# Patient Record
Sex: FEMALE | Race: OTHER | NOT HISPANIC OR LATINO
[De-identification: names, ages, dates, MRNs, and addresses within clinical notes are randomized per-mention and may not be internally consistent; named-entity substitution may affect disease eponyms.]

---

## 2019-09-05 ENCOUNTER — TRANSCRIPTION ENCOUNTER (OUTPATIENT)
Age: 54
End: 2019-09-05

## 2020-10-21 ENCOUNTER — INPATIENT (INPATIENT)
Facility: HOSPITAL | Age: 55
LOS: 4 days | Discharge: HOME | DRG: 280 | End: 2020-10-26
Attending: INTERNAL MEDICINE | Admitting: INTERNAL MEDICINE
Payer: MEDICARE

## 2020-10-21 VITALS
RESPIRATION RATE: 22 BRPM | DIASTOLIC BLOOD PRESSURE: 90 MMHG | SYSTOLIC BLOOD PRESSURE: 158 MMHG | HEART RATE: 123 BPM | OXYGEN SATURATION: 100 % | WEIGHT: 163.14 LBS

## 2020-10-21 DIAGNOSIS — E11.9 TYPE 2 DIABETES MELLITUS WITHOUT COMPLICATIONS: ICD-10-CM

## 2020-10-21 DIAGNOSIS — D72.829 ELEVATED WHITE BLOOD CELL COUNT, UNSPECIFIED: ICD-10-CM

## 2020-10-21 DIAGNOSIS — E78.5 HYPERLIPIDEMIA, UNSPECIFIED: ICD-10-CM

## 2020-10-21 DIAGNOSIS — R09.89 OTHER SPECIFIED SYMPTOMS AND SIGNS INVOLVING THE CIRCULATORY AND RESPIRATORY SYSTEMS: ICD-10-CM

## 2020-10-21 DIAGNOSIS — D63.8 ANEMIA IN OTHER CHRONIC DISEASES CLASSIFIED ELSEWHERE: ICD-10-CM

## 2020-10-21 DIAGNOSIS — R79.89 OTHER SPECIFIED ABNORMAL FINDINGS OF BLOOD CHEMISTRY: ICD-10-CM

## 2020-10-21 DIAGNOSIS — N18.5 CHRONIC KIDNEY DISEASE, STAGE 5: ICD-10-CM

## 2020-10-21 DIAGNOSIS — I16.0 HYPERTENSIVE URGENCY: ICD-10-CM

## 2020-10-21 DIAGNOSIS — I50.33 ACUTE ON CHRONIC DIASTOLIC (CONGESTIVE) HEART FAILURE: ICD-10-CM

## 2020-10-21 DIAGNOSIS — E87.5 HYPERKALEMIA: ICD-10-CM

## 2020-10-21 LAB
ALBUMIN SERPL ELPH-MCNC: 3.8 G/DL — SIGNIFICANT CHANGE UP (ref 3.3–5)
ALP SERPL-CCNC: 148 U/L — HIGH (ref 40–120)
ALT FLD-CCNC: 24 U/L — SIGNIFICANT CHANGE UP (ref 10–45)
ANION GAP SERPL CALC-SCNC: 11 MMOL/L — SIGNIFICANT CHANGE UP (ref 5–17)
ANION GAP SERPL CALC-SCNC: 15 MMOL/L — SIGNIFICANT CHANGE UP (ref 5–17)
APPEARANCE UR: CLEAR — SIGNIFICANT CHANGE UP
APPEARANCE UR: CLEAR — SIGNIFICANT CHANGE UP
APTT BLD: 28.1 SEC — SIGNIFICANT CHANGE UP (ref 27.5–35.5)
AST SERPL-CCNC: 27 U/L — SIGNIFICANT CHANGE UP (ref 10–40)
BACTERIA # UR AUTO: PRESENT /HPF
BACTERIA # UR AUTO: PRESENT /HPF
BASOPHILS # BLD AUTO: 0.1 K/UL — SIGNIFICANT CHANGE UP (ref 0–0.2)
BASOPHILS NFR BLD AUTO: 0.5 % — SIGNIFICANT CHANGE UP (ref 0–2)
BILIRUB SERPL-MCNC: 0.2 MG/DL — SIGNIFICANT CHANGE UP (ref 0.2–1.2)
BILIRUB UR-MCNC: NEGATIVE — SIGNIFICANT CHANGE UP
BILIRUB UR-MCNC: NEGATIVE — SIGNIFICANT CHANGE UP
BLD GP AB SCN SERPL QL: NEGATIVE — SIGNIFICANT CHANGE UP
BUN SERPL-MCNC: 74 MG/DL — HIGH (ref 7–23)
BUN SERPL-MCNC: 79 MG/DL — HIGH (ref 7–23)
CALCIUM SERPL-MCNC: 9 MG/DL — SIGNIFICANT CHANGE UP (ref 8.4–10.5)
CALCIUM SERPL-MCNC: 9.3 MG/DL — SIGNIFICANT CHANGE UP (ref 8.4–10.5)
CHLORIDE SERPL-SCNC: 108 MMOL/L — SIGNIFICANT CHANGE UP (ref 96–108)
CHLORIDE SERPL-SCNC: 110 MMOL/L — HIGH (ref 96–108)
CK MB CFR SERPL CALC: 12.5 NG/ML — HIGH (ref 0–6.7)
CK MB CFR SERPL CALC: 13 NG/ML — HIGH (ref 0–6.7)
CK SERPL-CCNC: 239 U/L — HIGH (ref 25–170)
CK SERPL-CCNC: 249 U/L — HIGH (ref 25–170)
CO2 SERPL-SCNC: 14 MMOL/L — LOW (ref 22–31)
CO2 SERPL-SCNC: 16 MMOL/L — LOW (ref 22–31)
COLOR SPEC: YELLOW — SIGNIFICANT CHANGE UP
COLOR SPEC: YELLOW — SIGNIFICANT CHANGE UP
CREAT ?TM UR-MCNC: 30 MG/DL — SIGNIFICANT CHANGE UP
CREAT SERPL-MCNC: 4.94 MG/DL — HIGH (ref 0.5–1.3)
CREAT SERPL-MCNC: 5.06 MG/DL — HIGH (ref 0.5–1.3)
DIFF PNL FLD: ABNORMAL
DIFF PNL FLD: ABNORMAL
EOSINOPHIL # BLD AUTO: 0.29 K/UL — SIGNIFICANT CHANGE UP (ref 0–0.5)
EOSINOPHIL NFR BLD AUTO: 1.6 % — SIGNIFICANT CHANGE UP (ref 0–6)
EPI CELLS # UR: ABNORMAL /HPF (ref 0–5)
EPI CELLS # UR: ABNORMAL /HPF (ref 0–5)
FERRITIN SERPL-MCNC: 535 NG/ML — HIGH (ref 15–150)
GLUCOSE BLDC GLUCOMTR-MCNC: 121 MG/DL — HIGH (ref 70–99)
GLUCOSE BLDC GLUCOMTR-MCNC: 125 MG/DL — HIGH (ref 70–99)
GLUCOSE SERPL-MCNC: 102 MG/DL — HIGH (ref 70–99)
GLUCOSE SERPL-MCNC: 274 MG/DL — HIGH (ref 70–99)
GLUCOSE UR QL: 100
GLUCOSE UR QL: NEGATIVE — SIGNIFICANT CHANGE UP
HCT VFR BLD CALC: 28.8 % — LOW (ref 34.5–45)
HGB BLD-MCNC: 9.2 G/DL — LOW (ref 11.5–15.5)
IMM GRANULOCYTES NFR BLD AUTO: 1.5 % — SIGNIFICANT CHANGE UP (ref 0–1.5)
INR BLD: 0.92 — SIGNIFICANT CHANGE UP (ref 0.88–1.16)
IRON SATN MFR SERPL: 19 % — SIGNIFICANT CHANGE UP (ref 14–50)
IRON SATN MFR SERPL: 44 UG/DL — SIGNIFICANT CHANGE UP (ref 30–160)
KETONES UR-MCNC: NEGATIVE — SIGNIFICANT CHANGE UP
KETONES UR-MCNC: NEGATIVE — SIGNIFICANT CHANGE UP
LEUKOCYTE ESTERASE UR-ACNC: ABNORMAL
LEUKOCYTE ESTERASE UR-ACNC: NEGATIVE — SIGNIFICANT CHANGE UP
LYMPHOCYTES # BLD AUTO: 16.3 % — SIGNIFICANT CHANGE UP (ref 13–44)
LYMPHOCYTES # BLD AUTO: 3 K/UL — SIGNIFICANT CHANGE UP (ref 1–3.3)
MCHC RBC-ENTMCNC: 28.9 PG — SIGNIFICANT CHANGE UP (ref 27–34)
MCHC RBC-ENTMCNC: 31.9 GM/DL — LOW (ref 32–36)
MCV RBC AUTO: 90.6 FL — SIGNIFICANT CHANGE UP (ref 80–100)
MONOCYTES # BLD AUTO: 1.03 K/UL — HIGH (ref 0–0.9)
MONOCYTES NFR BLD AUTO: 5.6 % — SIGNIFICANT CHANGE UP (ref 2–14)
NEUTROPHILS # BLD AUTO: 13.77 K/UL — HIGH (ref 1.8–7.4)
NEUTROPHILS NFR BLD AUTO: 74.5 % — SIGNIFICANT CHANGE UP (ref 43–77)
NITRITE UR-MCNC: NEGATIVE — SIGNIFICANT CHANGE UP
NITRITE UR-MCNC: NEGATIVE — SIGNIFICANT CHANGE UP
NRBC # BLD: 0 /100 WBCS — SIGNIFICANT CHANGE UP (ref 0–0)
NT-PROBNP SERPL-SCNC: HIGH PG/ML (ref 0–300)
OSMOLALITY UR: 310 MOSM/KG — SIGNIFICANT CHANGE UP (ref 300–900)
PH UR: 6 — SIGNIFICANT CHANGE UP (ref 5–8)
PH UR: 6 — SIGNIFICANT CHANGE UP (ref 5–8)
PLATELET # BLD AUTO: 348 K/UL — SIGNIFICANT CHANGE UP (ref 150–400)
POTASSIUM SERPL-MCNC: 5.8 MMOL/L — HIGH (ref 3.5–5.3)
POTASSIUM SERPL-MCNC: 5.9 MMOL/L — HIGH (ref 3.5–5.3)
POTASSIUM SERPL-SCNC: 5.8 MMOL/L — HIGH (ref 3.5–5.3)
POTASSIUM SERPL-SCNC: 5.9 MMOL/L — HIGH (ref 3.5–5.3)
PROCALCITONIN SERPL-MCNC: 0.61 NG/ML — HIGH (ref 0.02–0.1)
PROT ?TM UR-MCNC: >200 MG/DL — HIGH (ref 0–12)
PROT ?TM UR-MCNC: >200 MG/DL — HIGH (ref 0–12)
PROT SERPL-MCNC: 6.8 G/DL — SIGNIFICANT CHANGE UP (ref 6–8.3)
PROT UR-MCNC: 100 MG/DL
PROT UR-MCNC: >=300 MG/DL
PROT/CREAT UR-RTO: >6.7 RATIO — HIGH (ref 0–0.2)
PROTHROM AB SERPL-ACNC: 11.1 SEC — SIGNIFICANT CHANGE UP (ref 10.6–13.6)
RBC # BLD: 3.18 M/UL — LOW (ref 3.8–5.2)
RBC # FLD: 15.9 % — HIGH (ref 10.3–14.5)
RBC CASTS # UR COMP ASSIST: < 5 /HPF — SIGNIFICANT CHANGE UP
RBC CASTS # UR COMP ASSIST: < 5 /HPF — SIGNIFICANT CHANGE UP
RH IG SCN BLD-IMP: POSITIVE — SIGNIFICANT CHANGE UP
SARS-COV-2 RNA SPEC QL NAA+PROBE: SIGNIFICANT CHANGE UP
SODIUM SERPL-SCNC: 137 MMOL/L — SIGNIFICANT CHANGE UP (ref 135–145)
SODIUM SERPL-SCNC: 137 MMOL/L — SIGNIFICANT CHANGE UP (ref 135–145)
SODIUM UR-SCNC: 92 MMOL/L — SIGNIFICANT CHANGE UP
SP GR SPEC: 1.02 — SIGNIFICANT CHANGE UP (ref 1–1.03)
SP GR SPEC: 1.02 — SIGNIFICANT CHANGE UP (ref 1–1.03)
TIBC SERPL-MCNC: 234 UG/DL — SIGNIFICANT CHANGE UP (ref 220–430)
TRANSFERRIN SERPL-MCNC: 194 MG/DL — LOW (ref 200–360)
TROPONIN T SERPL-MCNC: 0.1 NG/ML — CRITICAL HIGH (ref 0–0.01)
TROPONIN T SERPL-MCNC: 0.25 NG/ML — CRITICAL HIGH (ref 0–0.01)
TROPONIN T SERPL-MCNC: 0.25 NG/ML — CRITICAL HIGH (ref 0–0.01)
TSH SERPL-MCNC: 1.29 UIU/ML — SIGNIFICANT CHANGE UP (ref 0.35–4.94)
UIBC SERPL-MCNC: 190 UG/DL — SIGNIFICANT CHANGE UP (ref 110–370)
UROBILINOGEN FLD QL: 0.2 E.U./DL — SIGNIFICANT CHANGE UP
UROBILINOGEN FLD QL: 0.2 E.U./DL — SIGNIFICANT CHANGE UP
WBC # BLD: 18.46 K/UL — HIGH (ref 3.8–10.5)
WBC # FLD AUTO: 18.46 K/UL — HIGH (ref 3.8–10.5)
WBC UR QL: < 5 /HPF — SIGNIFICANT CHANGE UP
WBC UR QL: < 5 /HPF — SIGNIFICANT CHANGE UP

## 2020-10-21 PROCEDURE — 99223 1ST HOSP IP/OBS HIGH 75: CPT

## 2020-10-21 PROCEDURE — 99291 CRITICAL CARE FIRST HOUR: CPT | Mod: CS

## 2020-10-21 PROCEDURE — 93306 TTE W/DOPPLER COMPLETE: CPT | Mod: 26

## 2020-10-21 PROCEDURE — 71045 X-RAY EXAM CHEST 1 VIEW: CPT | Mod: 26

## 2020-10-21 RX ORDER — DEXTROSE 50 % IN WATER 50 %
25 SYRINGE (ML) INTRAVENOUS ONCE
Refills: 0 | Status: DISCONTINUED | OUTPATIENT
Start: 2020-10-21 | End: 2020-10-26

## 2020-10-21 RX ORDER — BUMETANIDE 0.25 MG/ML
2 INJECTION INTRAMUSCULAR; INTRAVENOUS
Refills: 0 | Status: DISCONTINUED | OUTPATIENT
Start: 2020-10-22 | End: 2020-10-22

## 2020-10-21 RX ORDER — BUMETANIDE 0.25 MG/ML
2 INJECTION INTRAMUSCULAR; INTRAVENOUS ONCE
Refills: 0 | Status: COMPLETED | OUTPATIENT
Start: 2020-10-21 | End: 2020-10-21

## 2020-10-21 RX ORDER — HEPARIN SODIUM 5000 [USP'U]/ML
5000 INJECTION INTRAVENOUS; SUBCUTANEOUS EVERY 8 HOURS
Refills: 0 | Status: DISCONTINUED | OUTPATIENT
Start: 2020-10-21 | End: 2020-10-21

## 2020-10-21 RX ORDER — HEPARIN SODIUM 5000 [USP'U]/ML
4400 INJECTION INTRAVENOUS; SUBCUTANEOUS EVERY 6 HOURS
Refills: 0 | Status: DISCONTINUED | OUTPATIENT
Start: 2020-10-21 | End: 2020-10-24

## 2020-10-21 RX ORDER — BUDESONIDE AND FORMOTEROL FUMARATE DIHYDRATE 160; 4.5 UG/1; UG/1
2 AEROSOL RESPIRATORY (INHALATION)
Qty: 0 | Refills: 0 | DISCHARGE

## 2020-10-21 RX ORDER — CARVEDILOL PHOSPHATE 80 MG/1
6.25 CAPSULE, EXTENDED RELEASE ORAL EVERY 12 HOURS
Refills: 0 | Status: DISCONTINUED | OUTPATIENT
Start: 2020-10-21 | End: 2020-10-22

## 2020-10-21 RX ORDER — INSULIN GLARGINE 100 [IU]/ML
16 INJECTION, SOLUTION SUBCUTANEOUS
Qty: 0 | Refills: 0 | DISCHARGE

## 2020-10-21 RX ORDER — ASPIRIN/CALCIUM CARB/MAGNESIUM 324 MG
162 TABLET ORAL ONCE
Refills: 0 | Status: COMPLETED | OUTPATIENT
Start: 2020-10-21 | End: 2020-10-21

## 2020-10-21 RX ORDER — BUDESONIDE AND FORMOTEROL FUMARATE DIHYDRATE 160; 4.5 UG/1; UG/1
0 AEROSOL RESPIRATORY (INHALATION)
Qty: 0 | Refills: 0 | DISCHARGE

## 2020-10-21 RX ORDER — HEPARIN SODIUM 5000 [USP'U]/ML
INJECTION INTRAVENOUS; SUBCUTANEOUS
Qty: 25000 | Refills: 0 | Status: DISCONTINUED | OUTPATIENT
Start: 2020-10-21 | End: 2020-10-22

## 2020-10-21 RX ORDER — INSULIN LISPRO 100/ML
12 VIAL (ML) SUBCUTANEOUS
Qty: 0 | Refills: 0 | DISCHARGE

## 2020-10-21 RX ORDER — LANOLIN ALCOHOL/MO/W.PET/CERES
1 CREAM (GRAM) TOPICAL
Qty: 0 | Refills: 0 | DISCHARGE

## 2020-10-21 RX ORDER — SODIUM ZIRCONIUM CYCLOSILICATE 10 G/10G
10 POWDER, FOR SUSPENSION ORAL ONCE
Refills: 0 | Status: COMPLETED | OUTPATIENT
Start: 2020-10-21 | End: 2020-10-21

## 2020-10-21 RX ORDER — TICAGRELOR 90 MG/1
90 TABLET ORAL
Refills: 0 | Status: DISCONTINUED | OUTPATIENT
Start: 2020-10-22 | End: 2020-10-22

## 2020-10-21 RX ORDER — INSULIN LISPRO 100/ML
4 VIAL (ML) SUBCUTANEOUS
Refills: 0 | Status: DISCONTINUED | OUTPATIENT
Start: 2020-10-21 | End: 2020-10-26

## 2020-10-21 RX ORDER — CALCIUM ACETATE 667 MG
667 TABLET ORAL
Refills: 0 | Status: DISCONTINUED | OUTPATIENT
Start: 2020-10-21 | End: 2020-10-26

## 2020-10-21 RX ORDER — NIFEDIPINE 30 MG
30 TABLET, EXTENDED RELEASE 24 HR ORAL
Refills: 0 | Status: DISCONTINUED | OUTPATIENT
Start: 2020-10-21 | End: 2020-10-26

## 2020-10-21 RX ORDER — ATORVASTATIN CALCIUM 80 MG/1
80 TABLET, FILM COATED ORAL AT BEDTIME
Refills: 0 | Status: DISCONTINUED | OUTPATIENT
Start: 2020-10-21 | End: 2020-10-26

## 2020-10-21 RX ORDER — NITROGLYCERIN 6.5 MG
0.4 CAPSULE, EXTENDED RELEASE ORAL ONCE
Refills: 0 | Status: COMPLETED | OUTPATIENT
Start: 2020-10-21 | End: 2020-10-21

## 2020-10-21 RX ORDER — LANOLIN ALCOHOL/MO/W.PET/CERES
5 CREAM (GRAM) TOPICAL AT BEDTIME
Refills: 0 | Status: DISCONTINUED | OUTPATIENT
Start: 2020-10-21 | End: 2020-10-26

## 2020-10-21 RX ORDER — DEXTROSE 50 % IN WATER 50 %
15 SYRINGE (ML) INTRAVENOUS ONCE
Refills: 0 | Status: DISCONTINUED | OUTPATIENT
Start: 2020-10-21 | End: 2020-10-26

## 2020-10-21 RX ORDER — INFLUENZA VIRUS VACCINE 15; 15; 15; 15 UG/.5ML; UG/.5ML; UG/.5ML; UG/.5ML
0.5 SUSPENSION INTRAMUSCULAR ONCE
Refills: 0 | Status: COMPLETED | OUTPATIENT
Start: 2020-10-21 | End: 2020-10-21

## 2020-10-21 RX ORDER — INSULIN LISPRO 100/ML
VIAL (ML) SUBCUTANEOUS
Refills: 0 | Status: DISCONTINUED | OUTPATIENT
Start: 2020-10-21 | End: 2020-10-26

## 2020-10-21 RX ORDER — LEVALBUTEROL 1.25 MG/.5ML
0.63 SOLUTION, CONCENTRATE RESPIRATORY (INHALATION) EVERY 6 HOURS
Refills: 0 | Status: DISCONTINUED | OUTPATIENT
Start: 2020-10-21 | End: 2020-10-26

## 2020-10-21 RX ORDER — SODIUM BICARBONATE 1 MEQ/ML
2 SYRINGE (ML) INTRAVENOUS
Qty: 0 | Refills: 0 | DISCHARGE

## 2020-10-21 RX ORDER — TICAGRELOR 90 MG/1
180 TABLET ORAL ONCE
Refills: 0 | Status: COMPLETED | OUTPATIENT
Start: 2020-10-21 | End: 2020-10-21

## 2020-10-21 RX ORDER — SODIUM BICARBONATE 1 MEQ/ML
650 SYRINGE (ML) INTRAVENOUS THREE TIMES A DAY
Refills: 0 | Status: DISCONTINUED | OUTPATIENT
Start: 2020-10-21 | End: 2020-10-26

## 2020-10-21 RX ORDER — CALCIUM GLUCONATE 100 MG/ML
1 VIAL (ML) INTRAVENOUS ONCE
Refills: 0 | Status: COMPLETED | OUTPATIENT
Start: 2020-10-21 | End: 2020-10-21

## 2020-10-21 RX ORDER — GLUCAGON INJECTION, SOLUTION 0.5 MG/.1ML
1 INJECTION, SOLUTION SUBCUTANEOUS ONCE
Refills: 0 | Status: DISCONTINUED | OUTPATIENT
Start: 2020-10-21 | End: 2020-10-26

## 2020-10-21 RX ORDER — ATORVASTATIN CALCIUM 80 MG/1
1 TABLET, FILM COATED ORAL
Qty: 0 | Refills: 0 | DISCHARGE

## 2020-10-21 RX ORDER — DEXTROSE 50 % IN WATER 50 %
12.5 SYRINGE (ML) INTRAVENOUS ONCE
Refills: 0 | Status: DISCONTINUED | OUTPATIENT
Start: 2020-10-21 | End: 2020-10-26

## 2020-10-21 RX ORDER — INSULIN GLARGINE 100 [IU]/ML
12 INJECTION, SOLUTION SUBCUTANEOUS AT BEDTIME
Refills: 0 | Status: DISCONTINUED | OUTPATIENT
Start: 2020-10-21 | End: 2020-10-26

## 2020-10-21 RX ORDER — CALCIUM ACETATE 667 MG
1 TABLET ORAL
Qty: 0 | Refills: 0 | DISCHARGE

## 2020-10-21 RX ORDER — ASPIRIN/CALCIUM CARB/MAGNESIUM 324 MG
81 TABLET ORAL DAILY
Refills: 0 | Status: DISCONTINUED | OUTPATIENT
Start: 2020-10-22 | End: 2020-10-26

## 2020-10-21 RX ORDER — INSULIN GLARGINE 100 [IU]/ML
0 INJECTION, SOLUTION SUBCUTANEOUS
Qty: 0 | Refills: 0 | DISCHARGE

## 2020-10-21 RX ORDER — BUDESONIDE AND FORMOTEROL FUMARATE DIHYDRATE 160; 4.5 UG/1; UG/1
2 AEROSOL RESPIRATORY (INHALATION)
Refills: 0 | Status: DISCONTINUED | OUTPATIENT
Start: 2020-10-21 | End: 2020-10-26

## 2020-10-21 RX ORDER — HEPARIN SODIUM 5000 [USP'U]/ML
INJECTION INTRAVENOUS; SUBCUTANEOUS
Qty: 25000 | Refills: 0 | Status: DISCONTINUED | OUTPATIENT
Start: 2020-10-21 | End: 2020-10-21

## 2020-10-21 RX ORDER — SODIUM CHLORIDE 9 MG/ML
1000 INJECTION, SOLUTION INTRAVENOUS
Refills: 0 | Status: DISCONTINUED | OUTPATIENT
Start: 2020-10-21 | End: 2020-10-26

## 2020-10-21 RX ADMIN — HEPARIN SODIUM 900 UNIT(S)/HR: 5000 INJECTION INTRAVENOUS; SUBCUTANEOUS at 15:25

## 2020-10-21 RX ADMIN — BUDESONIDE AND FORMOTEROL FUMARATE DIHYDRATE 2 PUFF(S): 160; 4.5 AEROSOL RESPIRATORY (INHALATION) at 19:38

## 2020-10-21 RX ADMIN — ATORVASTATIN CALCIUM 80 MILLIGRAM(S): 80 TABLET, FILM COATED ORAL at 18:50

## 2020-10-21 RX ADMIN — Medication 162 MILLIGRAM(S): at 14:25

## 2020-10-21 RX ADMIN — TICAGRELOR 180 MILLIGRAM(S): 90 TABLET ORAL at 14:40

## 2020-10-21 RX ADMIN — Medication 5 MILLIGRAM(S): at 22:28

## 2020-10-21 RX ADMIN — INSULIN GLARGINE 12 UNIT(S): 100 INJECTION, SOLUTION SUBCUTANEOUS at 22:28

## 2020-10-21 RX ADMIN — BUMETANIDE 2 MILLIGRAM(S): 0.25 INJECTION INTRAMUSCULAR; INTRAVENOUS at 18:06

## 2020-10-21 RX ADMIN — CARVEDILOL PHOSPHATE 6.25 MILLIGRAM(S): 80 CAPSULE, EXTENDED RELEASE ORAL at 18:06

## 2020-10-21 RX ADMIN — HEPARIN SODIUM 900 UNIT(S)/HR: 5000 INJECTION INTRAVENOUS; SUBCUTANEOUS at 20:52

## 2020-10-21 RX ADMIN — SODIUM ZIRCONIUM CYCLOSILICATE 10 GRAM(S): 10 POWDER, FOR SUSPENSION ORAL at 16:22

## 2020-10-21 RX ADMIN — Medication 100 GRAM(S): at 16:21

## 2020-10-21 RX ADMIN — Medication 30 MILLIGRAM(S): at 22:28

## 2020-10-21 RX ADMIN — Medication 0.4 MILLIGRAM(S): at 14:40

## 2020-10-21 RX ADMIN — Medication 4 UNIT(S): at 18:50

## 2020-10-21 NOTE — H&P ADULT - ASSESSMENT
54 y/o Female, former smoker, CONTRAST INTOLERANCE (vomiting), with strong FamHx of CHF (mother and maternal aunt,  from CHF, age ~70s), and PMHx of Diastolic CHF (EF 55-60% by Echo on 10/21/20), HTN, HLD, IDDM, CKD Stage V, AOCD, multiple extended admission in 2020 for fluid overload per pt, and ?PAD (pt reports 60% blockage in left leg, but denies peripheral cath) who presented to [facility] ED on 10/21/20 c/o substernal chest pressure, radiating to left arm, 10/10 in severity, w/ associated SOB, dizziness, diaphoresis, and dry cough which started while making tea this AM. Pt is now admitted to [facility] for further management of acute on chronic diastolic CHF, HTN urgency, and r/o ACS.

## 2020-10-21 NOTE — H&P ADULT - PROBLEM SELECTOR PLAN 6
K 5.9 on admission, s/p Calcium Gluconate 1g IV x 1 and Lokelma 10g PO x 1 in ED  - F/u 10PM BMP and AM labs

## 2020-10-21 NOTE — H&P ADULT - PROBLEM SELECTOR PLAN 10
C/w home Atorvastatin 80mg QHS  - F/u AM lipid panel    VTE: Heparin SQ  Dispo: Pending clinical progression. NPO after midnight for possible cardiac cath  Case d/w Dr Silva

## 2020-10-21 NOTE — H&P ADULT - PROBLEM SELECTOR PLAN 3
Currently CP free and HD stable  - Pt given ASA 162mg PO x 1 by EMS and 162mg PO x 1 by ED attending. Also loaded w/ Brilinta 180mg PO x 1 in ED and started on Heparin gtt. Per Dr. Silva, d/c Heparin gtt and Brilinta at this time given suspicion that Troponin leak likely 2/2 demand ischemia  - Trop 0.1 on admission, likely 2/2 to hypertensive urgency and CKD stage V. F/u repeat cardiac enzymes at 6PM, 10PM, and AM labs  - EKG Sinus Tach @ 128BPM, LVH w/ strain pattern, f/u repeat EKGs w/ cardiac enzymes  - C/w ASA 81mg QD. Per Dr. Silva, may consider restarting Heparin gtt and DAPT if pt develops recurrent CP or cardiac enzymes significantly increase  - NPO after midnight for possible cardiac cath pending clinical progression. Consent in chart.  - Will continue to monitor Currently CP free and HD stable  - Pt given ASA 162mg PO x 1 by EMS and 162mg PO x 1 by ED attending. Also loaded w/ Brilinta 180mg PO x 1 in ED and started on Heparin gtt. Per Dr. Silva, d/c Heparin gtt and Brilinta at this time given suspicion that Troponin leak likely 2/2 demand ischemia  - Trop 0.1 on admission, likely 2/2 to hypertensive urgency and CKD stage V. F/u repeat cardiac enzymes at 6PM, 10PM, and AM labs  - EKG Sinus Tach @ 128BPM, LVH w/ strain pattern, f/u repeat EKGs w/ cardiac enzymes  - C/w ASA 81mg QD. Per Dr. Silva, may consider restarting Heparin gtt and DAPT if pt develops recurrent CP or cardiac enzymes significantly increase  - NPO after midnight for possible cardiac cath pending clinical progression. Consent in chart. Pt reports "vomiting" w/ contrast. Discuss premedication w/ Dr Silva and interventionalist when pt goes for cardiac cath  - Will continue to monitor

## 2020-10-21 NOTE — H&P ADULT - NSHPLABSRESULTS_GEN_ALL_CORE
EKG: Sinus Tach at 128BPM, LVH w/ strain pattern                          9.2    18.46 )-----------( 348      ( 21 Oct 2020 14:44 )             28.8     10-21    137  |  110<H>  |  74<H>  ----------------------------<  274<H>  5.9<H>   |  16<L>  |  4.94<H>    Ca    9.3      21 Oct 2020 14:44    TPro  6.8  /  Alb  3.8  /  TBili  0.2  /  DBili  x   /  AST  27  /  ALT  24  /  AlkPhos  148<H>  10-21    CARDIAC MARKERS ( 21 Oct 2020 14:44 )  x     / 0.10 ng/mL / x     / x     / x        PT/INR - ( 21 Oct 2020 14:44 )   PT: 11.1 sec;   INR: 0.92          PTT - ( 21 Oct 2020 14:44 )  PTT:28.1 sec

## 2020-10-21 NOTE — ED PROVIDER NOTE - CLINICAL SUMMARY MEDICAL DECISION MAKING FREE TEXT BOX
Pt w chest pain, sob prior to arrival, CP now resolved. concerning symptoms for ACS, STEMI code called based on EKG  - labs, cxr, reassess. Pt w chest pain, sob prior to arrival, CP now resolved. concerning symptoms for ACS, STEMI code called based on EKG

## 2020-10-21 NOTE — H&P ADULT - PROBLEM SELECTOR PLAN 1
+JVD, bibasilar crackles, satting well on 2L, no tachypnea or respiratory distress  - CXR reveals b/l pleural effusions, right basilar opacity  - Echo revealed EF 55-60%, grade III diastolic dysfunction, moderate MR, moderately dilated LA, RV free wall mildly hypertrophied. Per Echo read findings are c/w infiltrative dz like amyloid, but Dr Silva personally reviewed Echo and does not consider findings c/w amyloid  - Will diurese w/ Bumex 2mg IV BID (on Lasix 40mg QD at home)  - Started Coreg 6.25mg BID and Nifedipine 30mg BID (on Labetalol 200mg BID and Nifedipine 90mg QD at home)  - C/w home Symbicort and Xopenex PRN for SOB/wheezing  - F/u AM CXR  - Core measures, strict I/Os, daily weights, 1L fluid restriction

## 2020-10-21 NOTE — INPATIENT CERTIFICATION FOR MEDICARE PATIENTS - PHYSICIAN CONCUR
I concur with the Admission Order and I certify that services are provided in accordance with Section 42 CFR § 412.3
Ambulatory

## 2020-10-21 NOTE — ED ADULT NURSE NOTE - CHIEF COMPLAINT QUOTE
C/o chest pain x2 hours. Given 162mg Aspirin and Nitroglycerin spray with no relief. Oxygen saturation 92% on room air, 100% on non-rebreather. Speaking in short sentences. Denies fevers, chills.

## 2020-10-21 NOTE — ED PROVIDER NOTE - DIAGNOSTIC INTERPRETATION
ER Physician: Karen Berry MD  CHEST XRAY INTERPRETATION: lungs clear, cardiomegaly, bony structures intact

## 2020-10-21 NOTE — H&P ADULT - PROBLEM SELECTOR PLAN 9
Pt takes Lantus 16u QHS and Humalog 12u BID at home per pt  - Ordered for Lantus 12u QHS, Lispro 4u TID, FS/ISS  - F/u AM HgbA1c

## 2020-10-21 NOTE — ED PROVIDER NOTE - OBJECTIVE STATEMENT
54 yo hx of ESRD, DM, ?CHF, DM, emphysema w shortness of breath, and chest pain starting 3 hours pta, described as mid chest radiating to L arm. now resolved. per EMS sats 92 on RA. no leg swelling. states recent admission for "fluid on lungs". no cough, f/c, abd pain or other complaints.

## 2020-10-21 NOTE — CONSULT NOTE ADULT - SUBJECTIVE AND OBJECTIVE BOX
Patient is a 55y old  Female who presents with a chief complaint of     HPI: 55F CKD V (venous mapping completed 2 weeks ago), DM, CHF, DM, emphysema p/w shortness of breath and chest pain starting 3 hours pta, described as mid chest radiating to L arm. Now resolved. States she came to ED because she could not breath. Denies current CP or SOB, but does become SOB w/exertion. Denies abdominal pain or trauma. Makes urine, +dysuria of burning quality. Nephrology consulted for MARIUM.       PAST MEDICAL & SURGICAL HISTORY:  Diabetes    Bronchitis    Kidney disease    HTN (hypertension)          Allergies:  Motrin (Unknown)  penicillin (Unknown)      Home Medications:   buMETAnide Injectable 2 milliGRAM(s) IV Push once  heparin   Injectable 5000 Unit(s) SubCutaneous every 8 hours      Hospital Medications:   MEDICATIONS  (STANDING):  buMETAnide Injectable 2 milliGRAM(s) IV Push once  heparin   Injectable 5000 Unit(s) SubCutaneous every 8 hours      SOCIAL HISTORY:  Denies ETOh, Smoking,     Family History:  FAMILY HISTORY:        VITALS:  T(F): 96.8 (10-21-20 @ 15:03), Max: 97.6 (10-21-20 @ 14:25)  HR: 97 (10-21-20 @ 16:25)  BP: 182/116 (10-21-20 @ 16:25)  RR: 19 (10-21-20 @ 16:25)  SpO2: 99% (10-21-20 @ 16:25)  Wt(kg): --    Height (cm): 165.1 (10-21 @ 17:23)  Weight (kg): 74 (10-21 @ 14:24)  BMI (kg/m2): 27.1 (10-21 @ 17:23)  BSA (m2): 1.81 (10-21 @ 17:23)  CAPILLARY BLOOD GLUCOSE          Review of Systems:  CONSTITUTIONAL: No fever or chills.  RESPIRATORY: +shortness of breath, no cough  CARDIOVASCULAR: No Chest pain  GASTROINTESTINAL: No abdominal pain, nausea, vomiting, diarrhea  GENITOURINARY: No urinary frequency, gross hematuria, +dysuria  NEUROLOGICAL: No headache, weakness  SKIN: No rash or skin lesion  MUSCULOSKELETAL: No swelling      PHYSICAL EXAM:  GENERAL: Alert, awake, oriented x3 on RA breathing comfortably   CHEST/LUNG: Bilateral clear breath sounds  HEART: Regular rate and rhythm, no murmur, no gallops, no rub   ABDOMEN: Soft, nontender, non distended  EXTREMITIES: no pedal edema  Neurology: AAOx3, no  asterixis     LABS:  10-21    137  |  110<H>  |  74<H>  ----------------------------<  274<H>  5.9<H>   |  16<L>  |  4.94<H>    Ca    9.3      21 Oct 2020 14:44    TPro  6.8  /  Alb  3.8  /  TBili  0.2  /  DBili      /  AST  27  /  ALT  24  /  AlkPhos  148<H>  10-21    Creatinine Trend: 4.94 <--                        9.2    18.46 )-----------( 348      ( 21 Oct 2020 14:44 )             28.8     Urine Studies:                55F CKD V (venous mapping completed 2 weeks ago), DM, CHF, DM, emphysema p/w shortness of breath. Nephrology consulted for MARIUM.     Assessment/Plan:   #HTN urgency   #hyperkalemia  #MARIUM on CKD, possible acute hypoxic respiratory failure, possible acute CHF exacerbation   unclear baseline creatinine  possibly MARIUM on CKD vs baseline CKD vs cardiorenal syndrome     Recommend:   daily CXR   diuresis with 2mg IV Bumex q8h PRN to achieve euvolemia   q8h BMP + urine lytes   commence Lokelma 10g TID x48h PRN   resume home anti-HTN PRN   renal sono  strict I/Os   renal diet     Thank you for the opportunity to participate in the care of your patient. The nephrology service remains available to assist with any questions or concerns. Please feel free to reach us by paging the on-call nephrology fellow for urgent issues or as below.     Isaias Saba M.D.   PGY-4, Nephrology Fellow   C: [phone]   P: [phone] Patient is a 55y old  Female who presents with a chief complaint of     HPI: 55F CKD V (venous mapping completed 2 weeks ago), DM, CHF, DM, emphysema p/w shortness of breath and chest pain starting 3 hours pta, described as mid chest radiating to L arm. Now resolved. States she came to ED because she could not breath. Denies current CP or SOB, but does become SOB w/exertion. Denies abdominal pain or trauma. Makes urine, +dysuria of burning quality. Nephrology consulted for MARIUM.       PAST MEDICAL & SURGICAL HISTORY:  Diabetes    Bronchitis    Kidney disease    HTN (hypertension)          Allergies:  Motrin (Unknown)  penicillin (Unknown)      Home Medications:   buMETAnide Injectable 2 milliGRAM(s) IV Push once  heparin   Injectable 5000 Unit(s) SubCutaneous every 8 hours      Hospital Medications:   MEDICATIONS  (STANDING):  buMETAnide Injectable 2 milliGRAM(s) IV Push once  heparin   Injectable 5000 Unit(s) SubCutaneous every 8 hours      SOCIAL HISTORY:  Denies ETOh, Smoking,     Family History:  FAMILY HISTORY:        VITALS:  T(F): 96.8 (10-21-20 @ 15:03), Max: 97.6 (10-21-20 @ 14:25)  HR: 97 (10-21-20 @ 16:25)  BP: 182/116 (10-21-20 @ 16:25)  RR: 19 (10-21-20 @ 16:25)  SpO2: 99% (10-21-20 @ 16:25)  Wt(kg): --    Height (cm): 165.1 (10-21 @ 17:23)  Weight (kg): 74 (10-21 @ 14:24)  BMI (kg/m2): 27.1 (10-21 @ 17:23)  BSA (m2): 1.81 (10-21 @ 17:23)  CAPILLARY BLOOD GLUCOSE          Review of Systems:  CONSTITUTIONAL: No fever or chills.  RESPIRATORY: +shortness of breath, no cough  CARDIOVASCULAR: No Chest pain  GASTROINTESTINAL: No abdominal pain, nausea, vomiting, diarrhea  GENITOURINARY: No urinary frequency, gross hematuria, +dysuria  NEUROLOGICAL: No headache, weakness  SKIN: No rash or skin lesion  MUSCULOSKELETAL: No swelling      PHYSICAL EXAM:  GENERAL: Alert, awake, oriented x3 on RA breathing comfortably   CHEST/LUNG: Bilateral clear breath sounds  HEART: Regular rate and rhythm, no murmur, no gallops, no rub   ABDOMEN: Soft, nontender, non distended  EXTREMITIES: no pedal edema  Neurology: AAOx3, no  asterixis     LABS:  10-21    137  |  110<H>  |  74<H>  ----------------------------<  274<H>  5.9<H>   |  16<L>  |  4.94<H>    Ca    9.3      21 Oct 2020 14:44    TPro  6.8  /  Alb  3.8  /  TBili  0.2  /  DBili      /  AST  27  /  ALT  24  /  AlkPhos  148<H>  10-21    Creatinine Trend: 4.94 <--                        9.2    18.46 )-----------( 348      ( 21 Oct 2020 14:44 )             28.8     Urine Studies:                55F CKD V (venous mapping completed 2 weeks ago), DM, CHF, DM, emphysema p/w shortness of breath. Nephrology consulted for MARIUM.     Assessment/Plan:   #HTN urgency   #hyperkalemia  #MARIUM on CKD, possible acute hypoxic respiratory failure, possible acute CHF exacerbation   unclear baseline creatinine  possibly MARIUM on CKD vs baseline CKD vs cardiorenal syndrome     Recommend:   daily CXR   diuresis with 2mg IV Bumex q8h PRN to achieve euvolemia   q8h BMP + urine lytes   commence Lokelma 10g TID x48h PRN   commence sodium bicarb 650mg TID  obtain Phos, PTH, VitD25 and VitD1,25   resume home anti-HTN PRN   renal sono  strict I/Os   renal diet     Thank you for the opportunity to participate in the care of your patient. The nephrology service remains available to assist with any questions or concerns. Please feel free to reach us by paging the on-call nephrology fellow for urgent issues or as below.     Isaias Saba M.D.   PGY-4, Nephrology Fellow   C: [phone]   P: [phone]

## 2020-10-21 NOTE — ED ADULT NURSE REASSESSMENT NOTE - NS ED NURSE REASSESS COMMENT FT1
PT on phone with family. Tolerating treatment. Heparin order dc/d per order. PT to receive other medications, explained plan of care to pt.
Cardiology at bedside. Bedside echo performed. PT not to go to cath lab at this time. Continues to be monitored. Tolerated nasal cannula.
PT continues to be on cardiac monitor and oxygen. Pt given meds per order, 5/10 after nitro. Cardiology fellow at bedside and discussed with attending. PT to get bedside echo for decision of cath lab. Pt switched from NRB o2 to nasal cannula.

## 2020-10-21 NOTE — H&P ADULT - PROBLEM SELECTOR PLAN 4
WBC 18.46 on admission, no left shift. Currently afebrile, but endorses dysuria  - CXR right basilar opacity and b/l pleural effusions. Procal ordered, f/u results  - UA appears contaminated. Ordered repeat UA/UCx, f/u results  - No ABx at this time per Dr. Silva

## 2020-10-21 NOTE — ED PROVIDER NOTE - PROGRESS NOTE DETAILS
fellow interventional in ED, disc case, not STEMI code, will follow up on ECHO and trop, pt hds. interventional fellow in ED, disc case, not STEMI code, will follow up on ECHO and trop, pt hds. given asa, ticag. disc with fellow, will treat as nstemi, initiated on heparin. wbc elevated but  pt afebrile, no active cough, abd pain or any other localizing infectious sx.

## 2020-10-21 NOTE — H&P ADULT - PROBLEM SELECTOR PLAN 7
D-Dimer 713 on admission, currently satting well on 2-3L, non-tachypneic, borderline tachycardic. B/L calves TTP  - LE duplex ordered, f/u results  - No CTA Chest at this time per Dr Silva given CKD stage V. Consider VQ scan if suspicious for PE

## 2020-10-21 NOTE — H&P ADULT - PROBLEM SELECTOR PLAN 2
SBPs 150s-190s since admission  - Ordered for Bumex 2mg IV BID, Coreg 6.25mg BID, and Nifedipine 30mg BID  - Continue to monitor

## 2020-10-21 NOTE — H&P ADULT - PROBLEM SELECTOR PLAN 5
BUN/Cr 74/4.94, GFR 10. Cr 5.1 n September per discharge paperwork w/ pt  - Renal consulted, appreciate recs  - Renal US ordered, f/u results  - UA, ULytes ordered, f/u results  - Ordered Phos, PTH, Vit D 25 and Vit D 1,25, f/u results  - C/w home Calcium Acetate 667mg TID and Sodium Bicarb 650mg TID

## 2020-10-21 NOTE — H&P ADULT - HISTORY OF PRESENT ILLNESS
54 y/o Female, former smoker, CONTRAST INTOLERANCE (vomiting), with strong FamHx of CHF (mother and maternal aunt,  from CHF, age ~70s), and PMHx of Diastolic CHF (EF 55-60% by Echo on 10/21/20), HTN, HLD, IDDM, CKD Stage V, AOCD, multiple extended admission in 2020 for fluid overload per pt, and ?PAD (pt reports 60% blockage in left leg, but denies peripheral cath) who now presents to [facility] ED on 10/21/20 c/o substernal chest pressure, radiating to left arm, 10/10 in severity, w/ associated SOB, dizziness, diaphoresis, and dry cough which started while making tea this AM. Pt endorses 6 pillow orthopnea and SOB with ambulation of 1 block at baseline, but denies CP during those episodes. She states she had a normal stress test about 1 year ago, but does not recall where it was performed. Additionally, pt admits to dysuria and cloudy appearance of urine. She denies HA, syncope, fevers, chills, palpitations, n/v/d, melena, BRBPR, LE edema, recent travel, sick contacts, or any other symptoms at this time. During transport by EMS, pt given ASA 162mg PO x 1 and Nitro spray w/ little relief.  On arrival to ED, VS noted as T 97.6, , /90, RR 22, SpO2 100 on NRB. Labs significant for WBC 18.46 (no left shift), Hgb 9.2, BUN/Cr 74/4.94, K 5.9, Trop 0.1, BNP 25300. EKG revealed sinus tachycardia at 128BPM, LVH w/ strain pattern. CXR revealed cardiomegaly, right basilar opacity, and b/l pleural effusions. Echo revealed EF 55-60%, grade III diastolic dysfunction w/ elevated filling pressures, moderate symmetric LVH, restrictive filling pattern with a short deceleration time of E wave, RV free wall mildly hypertrophied, moderately dilated LA, moderate MR, trace NC/TR, small pericardial effusion.   In ED, pt given SLG NTG x 1, ASA 162mg PO x 1, Brilinta 180mg PO x 1, started Heparin gtt, Lokelma 10g PO x 1, and Calcium Gluconate 1g IV x 1. Heparin gtt d/c at this time as per Dr Silva. Pt is now admitted to [facility] for further management of acute on chronic diastolic CHF, HTN urgency, and r/o ACS.

## 2020-10-21 NOTE — ED ADULT NURSE NOTE - OBJECTIVE STATEMENT
55y F, A&ox3, presents to ed for CP starting approximatly x3 hrs ago with SOB. PT called 911 and was notification for stemi alert. PT given 2 81mg aspirin and 1 nitro by ems, reports mild relief. on arrival pt pain 7/10. Cath lab contacted and cardiology fellow to ED [facility] with Dr. Berry. Meds given, iv placed, on telemetry and monitoring. Pt on nonrebreather on arrival 100%. Cath lab delayed for further evaluation. Pt reports is pending dialysis as candidate.

## 2020-10-21 NOTE — H&P ADULT - NSICDXPASTMEDICALHX_GEN_ALL_CORE_FT
PAST MEDICAL HISTORY:  Anemia of chronic disease     Bronchitis     CKD (chronic kidney disease) stage 5, GFR less than 15 ml/min     Diabetes     Diastolic CHF     HLD (hyperlipidemia)     HTN (hypertension)     Kidney disease

## 2020-10-22 DIAGNOSIS — I21.4 NON-ST ELEVATION (NSTEMI) MYOCARDIAL INFARCTION: ICD-10-CM

## 2020-10-22 LAB
24R-OH-CALCIDIOL SERPL-MCNC: 12.4 NG/ML — LOW (ref 30–80)
A1C WITH ESTIMATED AVERAGE GLUCOSE RESULT: 6.1 % — HIGH (ref 4–5.6)
ALBUMIN SERPL ELPH-MCNC: 3.5 G/DL — SIGNIFICANT CHANGE UP (ref 3.3–5)
ALP SERPL-CCNC: 138 U/L — HIGH (ref 40–120)
ALT FLD-CCNC: 18 U/L — SIGNIFICANT CHANGE UP (ref 10–45)
ANION GAP SERPL CALC-SCNC: 15 MMOL/L — SIGNIFICANT CHANGE UP (ref 5–17)
APTT BLD: 46.8 SEC — HIGH (ref 27.5–35.5)
APTT BLD: 55.5 SEC — HIGH (ref 27.5–35.5)
APTT BLD: 58.6 SEC — HIGH (ref 27.5–35.5)
AST SERPL-CCNC: 20 U/L — SIGNIFICANT CHANGE UP (ref 10–40)
BASOPHILS # BLD AUTO: 0.06 K/UL — SIGNIFICANT CHANGE UP (ref 0–0.2)
BASOPHILS NFR BLD AUTO: 0.6 % — SIGNIFICANT CHANGE UP (ref 0–2)
BILIRUB SERPL-MCNC: <0.2 MG/DL — SIGNIFICANT CHANGE UP (ref 0.2–1.2)
BUN SERPL-MCNC: 76 MG/DL — HIGH (ref 7–23)
CALCIUM SERPL-MCNC: 8.9 MG/DL — SIGNIFICANT CHANGE UP (ref 8.4–10.5)
CALCIUM SERPL-MCNC: 9.1 MG/DL — SIGNIFICANT CHANGE UP (ref 8.4–10.5)
CHLORIDE SERPL-SCNC: 111 MMOL/L — HIGH (ref 96–108)
CHOLEST SERPL-MCNC: 298 MG/DL — HIGH
CK MB CFR SERPL CALC: 10.3 NG/ML — HIGH (ref 0–6.7)
CK MB CFR SERPL CALC: 11.9 NG/ML — HIGH (ref 0–6.7)
CK MB CFR SERPL CALC: 12.1 NG/ML — HIGH (ref 0–6.7)
CK SERPL-CCNC: 202 U/L — HIGH (ref 25–170)
CK SERPL-CCNC: 227 U/L — HIGH (ref 25–170)
CK SERPL-CCNC: 229 U/L — HIGH (ref 25–170)
CO2 SERPL-SCNC: 13 MMOL/L — LOW (ref 22–31)
CREAT SERPL-MCNC: 5.01 MG/DL — HIGH (ref 0.5–1.3)
CULTURE RESULTS: SIGNIFICANT CHANGE UP
EOSINOPHIL # BLD AUTO: 0.18 K/UL — SIGNIFICANT CHANGE UP (ref 0–0.5)
EOSINOPHIL NFR BLD AUTO: 1.7 % — SIGNIFICANT CHANGE UP (ref 0–6)
ESTIMATED AVERAGE GLUCOSE: 128 MG/DL — HIGH (ref 68–114)
GLUCOSE BLDC GLUCOMTR-MCNC: 111 MG/DL — HIGH (ref 70–99)
GLUCOSE BLDC GLUCOMTR-MCNC: 115 MG/DL — HIGH (ref 70–99)
GLUCOSE BLDC GLUCOMTR-MCNC: 127 MG/DL — HIGH (ref 70–99)
GLUCOSE BLDC GLUCOMTR-MCNC: 158 MG/DL — HIGH (ref 70–99)
GLUCOSE BLDC GLUCOMTR-MCNC: 68 MG/DL — LOW (ref 70–99)
GLUCOSE SERPL-MCNC: 98 MG/DL — SIGNIFICANT CHANGE UP (ref 70–99)
HCT VFR BLD CALC: 27.1 % — LOW (ref 34.5–45)
HCT VFR BLD CALC: 27.8 % — LOW (ref 34.5–45)
HCV AB S/CO SERPL IA: 0.08 S/CO — SIGNIFICANT CHANGE UP
HCV AB SERPL-IMP: SIGNIFICANT CHANGE UP
HDLC SERPL-MCNC: 50 MG/DL — SIGNIFICANT CHANGE UP
HGB BLD-MCNC: 8.7 G/DL — LOW (ref 11.5–15.5)
HGB BLD-MCNC: 8.8 G/DL — LOW (ref 11.5–15.5)
IMM GRANULOCYTES NFR BLD AUTO: 0.6 % — SIGNIFICANT CHANGE UP (ref 0–1.5)
LIPID PNL WITH DIRECT LDL SERPL: 212 MG/DL — HIGH
LYMPHOCYTES # BLD AUTO: 2.26 K/UL — SIGNIFICANT CHANGE UP (ref 1–3.3)
LYMPHOCYTES # BLD AUTO: 21 % — SIGNIFICANT CHANGE UP (ref 13–44)
MAGNESIUM SERPL-MCNC: 2.1 MG/DL — SIGNIFICANT CHANGE UP (ref 1.6–2.6)
MCHC RBC-ENTMCNC: 28.3 PG — SIGNIFICANT CHANGE UP (ref 27–34)
MCHC RBC-ENTMCNC: 28.4 PG — SIGNIFICANT CHANGE UP (ref 27–34)
MCHC RBC-ENTMCNC: 31.7 GM/DL — LOW (ref 32–36)
MCHC RBC-ENTMCNC: 32.1 GM/DL — SIGNIFICANT CHANGE UP (ref 32–36)
MCV RBC AUTO: 88.6 FL — SIGNIFICANT CHANGE UP (ref 80–100)
MCV RBC AUTO: 89.4 FL — SIGNIFICANT CHANGE UP (ref 80–100)
MONOCYTES # BLD AUTO: 0.88 K/UL — SIGNIFICANT CHANGE UP (ref 0–0.9)
MONOCYTES NFR BLD AUTO: 8.2 % — SIGNIFICANT CHANGE UP (ref 2–14)
NEUTROPHILS # BLD AUTO: 7.32 K/UL — SIGNIFICANT CHANGE UP (ref 1.8–7.4)
NEUTROPHILS NFR BLD AUTO: 67.9 % — SIGNIFICANT CHANGE UP (ref 43–77)
NON HDL CHOLESTEROL: 248 MG/DL — HIGH
NRBC # BLD: 0 /100 WBCS — SIGNIFICANT CHANGE UP (ref 0–0)
NRBC # BLD: 0 /100 WBCS — SIGNIFICANT CHANGE UP (ref 0–0)
PHOSPHATE SERPL-MCNC: 4.4 MG/DL — SIGNIFICANT CHANGE UP (ref 2.5–4.5)
PLATELET # BLD AUTO: 296 K/UL — SIGNIFICANT CHANGE UP (ref 150–400)
PLATELET # BLD AUTO: 300 K/UL — SIGNIFICANT CHANGE UP (ref 150–400)
POTASSIUM SERPL-MCNC: 5.1 MMOL/L — SIGNIFICANT CHANGE UP (ref 3.5–5.3)
POTASSIUM SERPL-SCNC: 5.1 MMOL/L — SIGNIFICANT CHANGE UP (ref 3.5–5.3)
PROT SERPL-MCNC: 6.6 G/DL — SIGNIFICANT CHANGE UP (ref 6–8.3)
PTH-INTACT FLD-MCNC: 551 PG/ML — HIGH (ref 15–65)
RBC # BLD: 3.06 M/UL — LOW (ref 3.8–5.2)
RBC # BLD: 3.11 M/UL — LOW (ref 3.8–5.2)
RBC # FLD: 15.8 % — HIGH (ref 10.3–14.5)
RBC # FLD: 16.1 % — HIGH (ref 10.3–14.5)
SODIUM SERPL-SCNC: 139 MMOL/L — SIGNIFICANT CHANGE UP (ref 135–145)
SPECIMEN SOURCE: SIGNIFICANT CHANGE UP
TRIGL SERPL-MCNC: 178 MG/DL — HIGH
TROPONIN T SERPL-MCNC: 0.28 NG/ML — CRITICAL HIGH (ref 0–0.01)
TROPONIN T SERPL-MCNC: 0.36 NG/ML — CRITICAL HIGH (ref 0–0.01)
TROPONIN T SERPL-MCNC: 0.39 NG/ML — CRITICAL HIGH (ref 0–0.01)
VIT D25+D1,25 OH+D1,25 PNL SERPL-MCNC: 23.1 PG/ML — SIGNIFICANT CHANGE UP (ref 19.9–79.3)
WBC # BLD: 10.76 K/UL — HIGH (ref 3.8–10.5)
WBC # BLD: 12.32 K/UL — HIGH (ref 3.8–10.5)
WBC # FLD AUTO: 10.76 K/UL — HIGH (ref 3.8–10.5)
WBC # FLD AUTO: 12.32 K/UL — HIGH (ref 3.8–10.5)

## 2020-10-22 PROCEDURE — 99233 SBSQ HOSP IP/OBS HIGH 50: CPT

## 2020-10-22 PROCEDURE — 71045 X-RAY EXAM CHEST 1 VIEW: CPT | Mod: 26

## 2020-10-22 PROCEDURE — 93010 ELECTROCARDIOGRAM REPORT: CPT

## 2020-10-22 RX ORDER — POLYETHYLENE GLYCOL 3350 17 G/17G
17 POWDER, FOR SOLUTION ORAL ONCE
Refills: 0 | Status: COMPLETED | OUTPATIENT
Start: 2020-10-22 | End: 2020-10-22

## 2020-10-22 RX ORDER — ACETAMINOPHEN 500 MG
650 TABLET ORAL ONCE
Refills: 0 | Status: COMPLETED | OUTPATIENT
Start: 2020-10-22 | End: 2020-10-22

## 2020-10-22 RX ORDER — HEPARIN SODIUM 5000 [USP'U]/ML
1050 INJECTION INTRAVENOUS; SUBCUTANEOUS
Qty: 25000 | Refills: 0 | Status: DISCONTINUED | OUTPATIENT
Start: 2020-10-22 | End: 2020-10-24

## 2020-10-22 RX ORDER — DEXTROSE 50 % IN WATER 50 %
15 SYRINGE (ML) INTRAVENOUS ONCE
Refills: 0 | Status: DISCONTINUED | OUTPATIENT
Start: 2020-10-22 | End: 2020-10-26

## 2020-10-22 RX ORDER — SODIUM ZIRCONIUM CYCLOSILICATE 10 G/10G
10 POWDER, FOR SUSPENSION ORAL ONCE
Refills: 0 | Status: COMPLETED | OUTPATIENT
Start: 2020-10-22 | End: 2020-10-22

## 2020-10-22 RX ORDER — ALPRAZOLAM 0.25 MG
0.25 TABLET ORAL EVERY 8 HOURS
Refills: 0 | Status: DISCONTINUED | OUTPATIENT
Start: 2020-10-22 | End: 2020-10-26

## 2020-10-22 RX ORDER — CLOPIDOGREL BISULFATE 75 MG/1
75 TABLET, FILM COATED ORAL DAILY
Refills: 0 | Status: DISCONTINUED | OUTPATIENT
Start: 2020-10-23 | End: 2020-10-26

## 2020-10-22 RX ORDER — NIFEDIPINE 30 MG
30 TABLET, EXTENDED RELEASE 24 HR ORAL ONCE
Refills: 0 | Status: COMPLETED | OUTPATIENT
Start: 2020-10-22 | End: 2020-10-22

## 2020-10-22 RX ORDER — CLOPIDOGREL BISULFATE 75 MG/1
600 TABLET, FILM COATED ORAL ONCE
Refills: 0 | Status: COMPLETED | OUTPATIENT
Start: 2020-10-22 | End: 2020-10-22

## 2020-10-22 RX ORDER — BUMETANIDE 0.25 MG/ML
2 INJECTION INTRAMUSCULAR; INTRAVENOUS EVERY 8 HOURS
Refills: 0 | Status: DISCONTINUED | OUTPATIENT
Start: 2020-10-22 | End: 2020-10-23

## 2020-10-22 RX ORDER — CARVEDILOL PHOSPHATE 80 MG/1
12.5 CAPSULE, EXTENDED RELEASE ORAL EVERY 12 HOURS
Refills: 0 | Status: DISCONTINUED | OUTPATIENT
Start: 2020-10-22 | End: 2020-10-24

## 2020-10-22 RX ADMIN — Medication 30 MILLIGRAM(S): at 02:42

## 2020-10-22 RX ADMIN — HEPARIN SODIUM 1050 UNIT(S)/HR: 5000 INJECTION INTRAVENOUS; SUBCUTANEOUS at 03:56

## 2020-10-22 RX ADMIN — TICAGRELOR 90 MILLIGRAM(S): 90 TABLET ORAL at 06:24

## 2020-10-22 RX ADMIN — Medication 81 MILLIGRAM(S): at 09:26

## 2020-10-22 RX ADMIN — SODIUM ZIRCONIUM CYCLOSILICATE 10 GRAM(S): 10 POWDER, FOR SUSPENSION ORAL at 02:10

## 2020-10-22 RX ADMIN — ATORVASTATIN CALCIUM 80 MILLIGRAM(S): 80 TABLET, FILM COATED ORAL at 22:25

## 2020-10-22 RX ADMIN — Medication 30 MILLIGRAM(S): at 09:27

## 2020-10-22 RX ADMIN — CARVEDILOL PHOSPHATE 12.5 MILLIGRAM(S): 80 CAPSULE, EXTENDED RELEASE ORAL at 17:31

## 2020-10-22 RX ADMIN — Medication 650 MILLIGRAM(S): at 04:30

## 2020-10-22 RX ADMIN — CLOPIDOGREL BISULFATE 600 MILLIGRAM(S): 75 TABLET, FILM COATED ORAL at 15:44

## 2020-10-22 RX ADMIN — POLYETHYLENE GLYCOL 3350 17 GRAM(S): 17 POWDER, FOR SOLUTION ORAL at 17:31

## 2020-10-22 RX ADMIN — Medication 650 MILLIGRAM(S): at 06:23

## 2020-10-22 RX ADMIN — BUDESONIDE AND FORMOTEROL FUMARATE DIHYDRATE 2 PUFF(S): 160; 4.5 AEROSOL RESPIRATORY (INHALATION) at 17:31

## 2020-10-22 RX ADMIN — Medication 667 MILLIGRAM(S): at 17:31

## 2020-10-22 RX ADMIN — BUMETANIDE 2 MILLIGRAM(S): 0.25 INJECTION INTRAMUSCULAR; INTRAVENOUS at 13:40

## 2020-10-22 RX ADMIN — Medication 650 MILLIGRAM(S): at 03:56

## 2020-10-22 RX ADMIN — Medication 4 UNIT(S): at 14:19

## 2020-10-22 RX ADMIN — Medication 650 MILLIGRAM(S): at 22:25

## 2020-10-22 RX ADMIN — HEPARIN SODIUM 1050 UNIT(S)/HR: 5000 INJECTION INTRAVENOUS; SUBCUTANEOUS at 16:28

## 2020-10-22 RX ADMIN — Medication 0.25 MILLIGRAM(S): at 22:31

## 2020-10-22 RX ADMIN — Medication 667 MILLIGRAM(S): at 13:40

## 2020-10-22 RX ADMIN — INSULIN GLARGINE 12 UNIT(S): 100 INJECTION, SOLUTION SUBCUTANEOUS at 23:03

## 2020-10-22 RX ADMIN — Medication 4 UNIT(S): at 17:31

## 2020-10-22 RX ADMIN — BUMETANIDE 2 MILLIGRAM(S): 0.25 INJECTION INTRAMUSCULAR; INTRAVENOUS at 06:23

## 2020-10-22 RX ADMIN — Medication 650 MILLIGRAM(S): at 13:40

## 2020-10-22 RX ADMIN — BUMETANIDE 2 MILLIGRAM(S): 0.25 INJECTION INTRAMUSCULAR; INTRAVENOUS at 22:27

## 2020-10-22 RX ADMIN — Medication 30 MILLIGRAM(S): at 22:24

## 2020-10-22 RX ADMIN — BUDESONIDE AND FORMOTEROL FUMARATE DIHYDRATE 2 PUFF(S): 160; 4.5 AEROSOL RESPIRATORY (INHALATION) at 06:24

## 2020-10-22 RX ADMIN — CARVEDILOL PHOSPHATE 6.25 MILLIGRAM(S): 80 CAPSULE, EXTENDED RELEASE ORAL at 06:23

## 2020-10-22 RX ADMIN — HEPARIN SODIUM 1050 UNIT(S)/HR: 5000 INJECTION INTRAVENOUS; SUBCUTANEOUS at 10:13

## 2020-10-22 NOTE — PHYSICAL THERAPY INITIAL EVALUATION ADULT - PLANNED THERAPY INTERVENTIONS, PT EVAL
gait training/strengthening/postural re-education/transfer training/bed mobility training/balance training

## 2020-10-22 NOTE — PROGRESS NOTE ADULT - ASSESSMENT
55F CKD V (venous mapping completed 2 weeks ago), DM, CHF, DM, emphysema p/w shortness of breath. Nephrology consulted for MARIUM.     Assessment/Plan:   #HTN urgency   #hyperkalemia  #MARIUM on CKD, possible acute hypoxic respiratory failure, possible acute CHF exacerbation   unclear baseline creatinine  possibly MARIUM on CKD vs baseline CKD vs cardiorenal syndrome     Recommend:   daily CXR   diuresis with 1-2mg IV Bumex q12h PRN to achieve euvolemia   q12h BMP + urine lytes   commence Lokelma 10g q24h PRN   continue with sodium bicarb 650mg TID  obtain Phos, PTH, VitD25 and VitD1,25   resume home anti-HTN PRN to achieve 25% reduction in BP within initial 24h   renal sono  strict I/Os   renal diet     Thank you for the opportunity to participate in the care of your patient. The nephrology service remains available to assist with any questions or concerns. Please feel free to reach us by paging the on-call nephrology fellow for urgent issues or as below.     Isaias Saba M.D.   PGY-4, Nephrology Fellow   C: [phone]   P: [phone]

## 2020-10-22 NOTE — PROGRESS NOTE ADULT - SUBJECTIVE AND OBJECTIVE BOX
Patient is a 55y Female seen and evaluated at bedside.     c/o chronic leg pain  feels otherwise well  no CP SOB   BP elevated   UOP adequate     Meds:    ALPRAZolam 0.25 every 8 hours PRN  aspirin enteric coated 81 daily  atorvastatin 80 at bedtime  budesonide  80 MICROgram(s)/formoterol 4.5 MICROgram(s) Inhaler 2 two times a day  buMETAnide Injectable 2 every 8 hours  calcium acetate 667 three times a day with meals  carvedilol 12.5 every 12 hours  dextrose 40% Gel 15 once PRN  dextrose 5%. 1000 <Continuous>  dextrose 50% Injectable 12.5 once  dextrose 50% Injectable 25 once  dextrose 50% Injectable 25 once  glucagon  Injectable 1 once PRN  heparin   Injectable 4400 every 6 hours PRN  heparin  Infusion.  <Continuous>  influenza   Vaccine 0.5 once  insulin glargine Injectable (LANTUS) 12 at bedtime  insulin lispro (ADMELOG) corrective regimen sliding scale  Before meals and at bedtime  insulin lispro Injectable (ADMELOG) 4 three times a day before meals  levalbuterol Inhalation 0.63 every 6 hours PRN  melatonin 5 at bedtime  NIFEdipine XL 30 two times a day  sodium bicarbonate 650 three times a day  ticagrelor 90 two times a day      T(C): , Max: 37.1 (10-21-20 @ 18:17)  T(F): , Max: 98.7 (10-21-20 @ 18:17)  HR: 104 (10-22-20 @ 09:16)  BP: 172/81 (10-22-20 @ 09:16)  BP(mean): 122 (10-21-20 @ 17:26)  RR: 18 (10-22-20 @ 09:16)  SpO2: 98% (10-22-20 @ 09:16)  Wt(kg): --    10-21 @ 07:01  -  10-22 @ 07:00  --------------------------------------------------------  IN: 288 mL / OUT: 951 mL / NET: -663 mL      Height (cm): 165.1 (10-21 @ 17:26)  Weight (kg): 74 (10-21 @ 17:26)  BMI (kg/m2): 27.1 (10-21 @ 17:26)  BSA (m2): 1.81 (10-21 @ 17:26)      Review of Systems:  CONSTITUTIONAL: No fever or chills.  RESPIRATORY: no shortness of breath, no cough  CARDIOVASCULAR: No Chest pain  GASTROINTESTINAL: No abdominal pain, nausea, vomiting, diarrhea  NEUROLOGICAL: No headache, weakness  MUSCULOSKELETAL: No swelling, +chronic LE pain       PHYSICAL EXAM:  GENERAL: Alert, awake, oriented x3 on RA breathing comfortably   CHEST/LUNG: Bilateral clear breath sounds  HEART: Regular rate and rhythm, no murmur, no gallops, no rub   ABDOMEN: Soft, nontender, non distended  EXTREMITIES: no pedal edema      LABS:                        8.8    10.76 )-----------( 296      ( 22 Oct 2020 05:44 )             27.8     10-22    139  |  111<H>  |  76<H>  ----------------------------<  98  5.1   |  13<L>  |  5.01<H>    Ca    9.1      22 Oct 2020 05:44  Phos  4.4     10-22  Mg     2.1     10-22    TPro  6.6  /  Alb  3.5  /  TBili  <0.2  /  DBili  x   /  AST  20  /  ALT  18  /  AlkPhos  138<H>  10-22    Cholesterol, Serum: 298 mg/dL <H> (10-22 @ 05:44)  Hepatitis C Virus S/CO Ratio: 0.08 S/CO (10-22 @ 05:44)    PT/INR - ( 21 Oct 2020 14:44 )   PT: 11.1 sec;   INR: 0.92          PTT - ( 22 Oct 2020 09:42 )  PTT:58.6 sec  Urinalysis Basic - ( 21 Oct 2020 23:09 )    Color: Yellow / Appearance: Clear / S.020 / pH: x  Gluc: x / Ketone: NEGATIVE  / Bili: Negative / Urobili: 0.2 E.U./dL   Blood: x / Protein: 100 mg/dL / Nitrite: NEGATIVE   Leuk Esterase: NEGATIVE / RBC: < 5 /HPF / WBC < 5 /HPF   Sq Epi: x / Non Sq Epi: 5-10 /HPF / Bacteria: Present /HPF      Osmolality, Random Urine: 310 mosm/kg (10-21 @ 23:09)  Sodium, Random Urine: 92 mmol/L (10-21 @ 23:09)  Creatinine, Random Urine: 30 mg/dL (10-21 @ 23:09)  Protein/Creatinine Ratio Calculation: >6.7 Ratio (10-21 @ 23:09)        RADIOLOGY & ADDITIONAL STUDIES:

## 2020-10-22 NOTE — DIETITIAN INITIAL EVALUATION ADULT. - PROBLEM SELECTOR PLAN 3
Currently CP free and HD stable  - Pt given ASA 162mg PO x 1 by EMS and 162mg PO x 1 by ED attending. Also loaded w/ Brilinta 180mg PO x 1 in ED and started on Heparin gtt. Per Dr. Silva, d/c Heparin gtt and Brilinta at this time given suspicion that Troponin leak likely 2/2 demand ischemia  - Trop 0.1 on admission, likely 2/2 to hypertensive urgency and CKD stage V. F/u repeat cardiac enzymes at 6PM, 10PM, and AM labs  - EKG Sinus Tach @ 128BPM, LVH w/ strain pattern, f/u repeat EKGs w/ cardiac enzymes  - C/w ASA 81mg QD. Per Dr. Silva, may consider restarting Heparin gtt and DAPT if pt develops recurrent CP or cardiac enzymes significantly increase  - NPO after midnight for possible cardiac cath pending clinical progression. Consent in chart. Pt reports "vomiting" w/ contrast. Discuss premedication w/ Dr Silva and interventionalist when pt goes for cardiac cath  - Will continue to monitor

## 2020-10-22 NOTE — DIETITIAN INITIAL EVALUATION ADULT. - PERTINENT LABORATORY DATA
last 3 POCT 151 121 125  BUN/Cr 76/5.01  ALK PHOS 238  CHOL 298 /  / CHOL 212   Na Phos K WDL   GFR 10

## 2020-10-22 NOTE — PROGRESS NOTE ADULT - SUBJECTIVE AND OBJECTIVE BOX
Interventional Cardiology PA Adult Progress Note    C.C.: Chest pain     Subjective Assessment: Patient seen and examined at bedside this morning. Patient appears comfortable in bed but does appear to be a little anxious on exam. Patient denies any complaints of chest pain and states she had shortness of breath overnight because she was moving around a lot to adjust her blankets.     ROS Negative except as per Subjective and HPI  	  MEDICATIONS:  buMETAnide Injectable 2 milliGRAM(s) IV Push every 8 hours  carvedilol 12.5 milliGRAM(s) Oral every 12 hours  NIFEdipine XL 30 milliGRAM(s) Oral two times a day  budesonide  80 MICROgram(s)/formoterol 4.5 MICROgram(s) Inhaler 2 Puff(s) Inhalation two times a day  levalbuterol Inhalation 0.63 milliGRAM(s) Inhalation every 6 hours PRN  ALPRAZolam 0.25 milliGRAM(s) Oral every 8 hours PRN  melatonin 5 milliGRAM(s) Oral at bedtime  atorvastatin 80 milliGRAM(s) Oral at bedtime  dextrose 40% Gel 15 Gram(s) Oral once PRN  dextrose 50% Injectable 12.5 Gram(s) IV Push once  dextrose 50% Injectable 25 Gram(s) IV Push once  dextrose 50% Injectable 25 Gram(s) IV Push once  glucagon  Injectable 1 milliGRAM(s) IntraMuscular once PRN  insulin glargine Injectable (LANTUS) 12 Unit(s) SubCutaneous at bedtime  insulin lispro (ADMELOG) corrective regimen sliding scale   SubCutaneous Before meals and at bedtime  insulin lispro Injectable (ADMELOG) 4 Unit(s) SubCutaneous three times a day before meals  aspirin enteric coated 81 milliGRAM(s) Oral daily  calcium acetate 667 milliGRAM(s) Oral three times a day with meals  dextrose 5%. 1000 milliLiter(s) IV Continuous <Continuous>  heparin   Injectable 4400 Unit(s) IV Push every 6 hours PRN  heparin  Infusion.  Unit(s)/Hr IV Continuous <Continuous>  influenza   Vaccine 0.5 milliLiter(s) IntraMuscular once  sodium bicarbonate 650 milliGRAM(s) Oral three times a day  ticagrelor 90 milliGRAM(s) Oral two times a day      PHYSICAL EXAM:  TELEMETRY: No overnight events.   T(C): 36.4 (10-22-20 @ 13:01), Max: 37.1 (10-21-20 @ 18:17)  HR: 98 (10-22-20 @ 13:30) (95 - 116)  BP: 167/80 (10-22-20 @ 13:30) (161/78 - 192/85)  RR: 18 (10-22-20 @ 13:30) (18 - 22)  SpO2: 98% (10-22-20 @ 09:16) (97% - 100%)  Wt(kg): --  I&O's Summary    21 Oct 2020 07:01  -  22 Oct 2020 07:00  --------------------------------------------------------  IN: 288 mL / OUT: 951 mL / NET: -663 mL    22 Oct 2020 07:01  -  22 Oct 2020 14:26  --------------------------------------------------------  IN: 180 mL / OUT: 300 mL / NET: -120 mL      Height (cm): 165.1 (10-21 @ 17:26)  Weight (kg): 74 (10-21 @ 17:26)  BMI (kg/m2): 27.1 (10-21 @ 17:26)  BSA (m2): 1.81 (10-21 @ 17:26)                                           Appearance: NAD	  HEENT: Normal oral mucosa, PERRL, EOMI	  Neck: Supple, - JVD  Cardiovascular: Normal S1/S2, No JVD, +systolic murmur grade II/VI  Respiratory: Lungs clear to auscultation, No Rales, Rhonchi, Wheezing	  Gastrointestinal:  Soft, Non-tender	  Skin: No rashes, No ecchymoses, No cyanosis  Extremities: Normal range of motion, No clubbing, cyanosis or edema  Vascular: Peripheral pulses palpable 2+ bilaterally  Neurologic: Non-focal  Psychiatry: A & O x 3, Mood & affect appropriate  	    ECG:  	  RADIOLOGY:   DIAGNOSTIC TESTING:  [X] Echocardiogram: EF 55-60%, grade III diastolic dysfunction w/ elevated filling pressure, moderate symmetric LVH, restrictive filling pattern w/ short deceleration time of E wave, RV free wall mildly hypertrophied, moderately dilated LA, moderate MR, trace KS/TR, small pericardial effusion.   [ ] Catheterization:  [ ] Stress Test:    [ ] YANICK  OTHER: 	    LABS:	 	  CARDIAC MARKERS ( 22 Oct 2020 12:40 )  x     / 0.36 ng/mL / 227 U/L / x     / 11.9 ng/mL  CARDIAC MARKERS ( 22 Oct 2020 05:44 )  x     / 0.28 ng/mL / 229 U/L / x     / 12.1 ng/mL  CARDIAC MARKERS ( 21 Oct 2020 23:03 )  x     / 0.25 ng/mL / 239 U/L / x     / 12.5 ng/mL  CARDIAC MARKERS ( 21 Oct 2020 19:09 )  x     / 0.25 ng/mL / 249 U/L / x     / 13.0 ng/mL  CARDIAC MARKERS ( 21 Oct 2020 14:44 )  x     / 0.10 ng/mL / x     / x     / x                              8.8    10.76 )-----------( 296      ( 22 Oct 2020 05:44 )             27.8     10-22    139  |  111<H>  |  76<H>  ----------------------------<  98  5.1   |  13<L>  |  5.01<H>    Ca    9.1      22 Oct 2020 05:44  Phos  4.4     10-22  Mg     2.1     10-22    TPro  6.6  /  Alb  3.5  /  TBili  <0.2  /  DBili  x   /  AST  20  /  ALT  18  /  AlkPhos  138<H>  10-22    proBNP: Serum Pro-Brain Natriuretic Peptide: 96239 pg/mL (10-21 @ 14:44)    Lipid Profile:   HgA1c:   TSH: Thyroid Stimulating Hormone, Serum: 1.293 uIU/mL (10-21 @ 19:09)    PT/INR - ( 21 Oct 2020 14:44 )   PT: 11.1 sec;   INR: 0.92          PTT - ( 22 Oct 2020 09:42 )  PTT:58.6 sec

## 2020-10-22 NOTE — DIETITIAN INITIAL EVALUATION ADULT. - OTHER INFO
56 y/o F, former smoker, PMHx of Diastolic CHF (EF 55-60% by Echo on 10/21/20), HTN, HLD, IDDM, CKD Stage V- venous mapping completed 2 weeks ago, AOCD, multiple extended admission in September 2020 for fluid overload, ?PAD who now presents to [facility] ED on 10/21/20 c/o substernal chest pressure, radiating to left arm, 10/10 in severity, associated SOB, dizziness, diaphoresis, and dry cough. Pt is now admitted to [facility] for further management of acute on chronic diastolic CHF, HTN urgency, and r/o ACS; Echo revealed EF 55-60%. Nephrology consulted for MARIUM - MARIUM on CKD, possible acute hypoxic respiratory failure, possible acute CHF exacerbation, CRS ( K 5.9 on admission, s/p Calcium Gluconate 1g IV x 1 and Lokelma 10g PO x 1 in ED). 54 y/o F, former smoker, PMHx of Diastolic CHF (EF 55-60% by Echo on 10/21/20), HTN, HLD, IDDM, CKD Stage V- venous mapping completed 2 weeks ago, AOCD, multiple extended admission in September 2020 for fluid overload, ?PAD who now presents to [facility] ED on 10/21/20 c/o substernal chest pressure, radiating to left arm, 10/10 in severity, associated SOB, dizziness, diaphoresis, and dry cough. Pt is now admitted to [facility] for further management of acute on chronic diastolic CHF, HTN urgency, and r/o ACS; Echo revealed EF 55-60%. Nephrology consulted for MARIUM - MARIUM on CKD, possible acute hypoxic respiratory failure, possible acute CHF exacerbation, CRS ( K 5.9 on admission, s/p Calcium Gluconate 1g IV x 1 and Lokelma 10g PO x 1 in ED). Skin: no edema/pressure ulcers noted at this time, cara 22. Pain: head 9/9. GI WDL per flow sheets.  Spoke with pt/RN. Pt ordered for NPO@12 / DASH Consistent Carbohydrate diet + 1000ml FR 10/12. Pt reports she is hungry at this time and wants to eat. Despite being hungry at present, reprots lack of PO intake PTA which has been ongoing x3 years. Despite decreased PO intake denies wt changes,  pounds. Pt admitted with wt of 163 pounds, suggest 7 pound wt gain which is likely d/t Fluid Shifts in CHF/Renal pt. Pt does own cooking for most part PTA, no use of salt (+MRS DASH). Does not consistently consume High sodium items. Does like soup however makes herself (seems low to be low in sodium). Reports being told by Renal MD to consume "a lot of water." Also drinks 2 Glucerna/day (RD provided pt with coupons). NKFA. No issues chewing/swallowing at this time.   Please see below for nutritions recommendations. Recs made with team.

## 2020-10-22 NOTE — PHYSICAL THERAPY INITIAL EVALUATION ADULT - GENERAL OBSERVATIONS, REHAB EVAL
Pt received OOB in chair +IV line +tele. PT received consent to treat from HELADIO Elizondo. Pt c/o 10/10 pain  AAOx3 follows simple commands 100% of the time. Pt amb 50 ft x2 with HHA using IV pole CGA. Stairs deferred today secondary to pt being on hep drip. Pt to follow up with stairs and make decision on d/c plan. Pt was left as received with call bell in reach.

## 2020-10-22 NOTE — PROGRESS NOTE ADULT - PROBLEM SELECTOR PLAN 7
D-Dimer 713 on admission, currently satting well on 2-3L, non-tachypneic, borderline tachycardic. B/L calves TTP  - LE duplex ordered  - No CTA Chest at this time per Dr Silva given CKD stage V. Consider VQ scan if suspicious for PE

## 2020-10-22 NOTE — PROGRESS NOTE ADULT - PROBLEM SELECTOR PLAN 8
Hgb 9.2 on admission, likely 2/2 AOCD from CKD Stage V  - irons studies normal   - Hgb 8.8 on 10/22   - Maintain active T/S  - continue to monitor CBC

## 2020-10-22 NOTE — PROGRESS NOTE ADULT - PROBLEM SELECTOR PLAN 6
K 5.9 on admission, s/p Calcium Gluconate 1g IV x 1 and Lokelma 10g PO x 1 in ED  - RESOLVED   - K 5.1 on 10/22 AM labs   - continue to monitor daily BMP's

## 2020-10-22 NOTE — PROGRESS NOTE ADULT - PROBLEM SELECTOR PLAN 3
SBPs 150s-190s since admission  - Ordered for Bumex 2 mg IV TID, Coreg 12.5 mg BID, and Nifedipine 30 mg BID  - Continue to monitor

## 2020-10-22 NOTE — DIETITIAN INITIAL EVALUATION ADULT. - PERTINENT MEDS FT
Ecotrin, Brilinta, Heparin, lantus 12u bedtime, Admelog correction, Bumex, Lipitor, Coreg, PHOSLO, NaBicarb, Procardia

## 2020-10-22 NOTE — PHYSICAL THERAPY INITIAL EVALUATION ADULT - ADDITIONAL COMMENTS
Pt states she lives alone in elevator building, pt has 12 stairs to get into building. PTA pt used a cane for walking, was independent with ADL's.

## 2020-10-22 NOTE — PROGRESS NOTE ADULT - ATTENDING COMMENTS
55F w/ dCHF, CKD V, poorly controlled HTN, DM and HLD p/w acute on chronic dCHF exacerbation    -dCHF  -HTN  -Renal - Advanced CKD 55F w/ dCHF, CKD V, poorly controlled HTN, DM and HLD p/w acute on chronic dCHF exacerbation    -dCHF - Grade III Diastolic dysfunction, poor renal fxn and poorly controlled BP - c/w Bumex 2mg IV TID, diuresing well, c/w Coreg 12.5 BID and Nifedipine 30 BID; Will work with these two medication and uptitrate as needed - goal is to identify a simple BP regimen th patient can be compliant with as an outpatient; Will also require PO Torsemide when ready for discharge  -CAD - Mild chest discomfort on admission now resolved, EKG changes c/w LVH and repol abnormalities, Mild trop leak in the setting of CKF and CHF likely 2/2 Type II MI and TTE w/ normal EF 55-60% and no WMA - given these findings and risk of worsening renal failure w/ contrast use - will elect to medically manage presumed CAD w/ ASA/Plavix and Lipitor; Will complete 48hrs of Heparin gtt; Will not pursue Cath on this admission unless pt. develops refractory CP, Electrical instability or systolic dysfunction  -Renal - Advanced CKD - Pt. aware of the likelty need for HD in her future but no indication for urgent HD at this time. Currently responding well to IV diuresis w/ Bumex 2mg IV TID; Renal consulted and following; Will continue to manage BP - goal SBPs 130s-140s; Avoid any nephrotoxic agents  -DASH/Renal diet  -DVT PPx  -OOB to chair  -Dispo: Cardiac Tele  -Full Code    Juanpablo Silva MD  Cardiology Attending

## 2020-10-22 NOTE — DIETITIAN INITIAL EVALUATION ADULT. - OTHER CALCULATIONS
%LCB=174; IBW used to calculate energy needs due to pt's current body weight exceeding 120% of IBW; adjusted for age, based on CHF, renal - fluids per team

## 2020-10-22 NOTE — PROGRESS NOTE ADULT - PROBLEM SELECTOR PLAN 2
Currently CP free and HD stable  - Pt given ASA 162mg PO x 1 by EMS and 162mg PO x 1 by ED attending. Also loaded w/ Brilinta 180mg PO x 1 in ED and started on Heparin gtt. Per Dr. Silva, d/c Heparin gtt and Brilinta at this time given suspicion that Troponin leak likely 2/2 demand ischemia upon admission   - Trop 0.1 -> 0.25 -> 0.25 -> 0.28 -> 0.36  - continue to trend to peak   - restarted heparin gtt around 8 PM 10/21, continue to achieve 48 hours of therapy  - EKG Sinus Tach @ 128BPM, LVH w/ strain pattern upon admission   - repeat AM EKG unchanged from admission   - Brilinta transitioned to Plavix on 10/22  - continue ASA 81 mg daily and Plavix 75 mg daily   - deemed patient would not undergo cardiac cath, continue with medical management

## 2020-10-22 NOTE — PROGRESS NOTE ADULT - PROBLEM SELECTOR PLAN 1
+JVD, bibasilar crackles, satting well on 2L, no tachypnea or respiratory distress on admission  - CXR reveals b/l pleural effusions, right basilar opacity on admission  - Echo revealed EF 55-60%, grade III diastolic dysfunction, moderate MR, moderately dilated LA, RV free wall mildly hypertrophied. Per Echo read findings are c/w infiltrative dz like amyloid, but Dr Silva personally reviewed Echo and does not consider findings c/w amyloid  - diuretic increased to Bumex 2 mg TID on 10/22  - increased to Coreg 12.5 mg BID and continued Nifedipine 30mg BID (on Labetalol 200mg BID and Nifedipine 90mg QD at home)  - C/w home Symbicort and Xopenex PRN for SOB/wheezing  - CXR on 10/22 improved   - Core measures, strict I/Os, daily weights, 1L fluid restriction

## 2020-10-22 NOTE — PHYSICAL THERAPY INITIAL EVALUATION ADULT - PERTINENT HX OF CURRENT PROBLEM, REHAB EVAL
Pt is 56 yo female who presented to [facility] ED on 10/21/20 c/o substernal chest pressure, radiating to left arm, 10/10 in severity, w/ associated SOB, dizziness, diaphoresis, and dry cough which started while making tea this AM. Pt is now admitted to [facility] for further management of acute on chronic diastolic CHF, HTN urgency, and r/o ACS.

## 2020-10-22 NOTE — PROGRESS NOTE ADULT - PROBLEM SELECTOR PLAN 5
BUN/Cr 74/4.94, GFR 10. Cr 5.1 n September per discharge paperwork w/ pt  - Renal consulted, appreciate recs  - Renal US ordered  - C/w home Calcium Acetate 667mg TID and Sodium Bicarb 650mg TID  - per Renal, patient will not need initiation of dialysis during this admission

## 2020-10-22 NOTE — PROGRESS NOTE ADULT - PROBLEM SELECTOR PLAN 4
WBC 18.46 on admission, no left shift. Currently afebrile, but endorses dysuria  - CXR right basilar opacity and b/l pleural effusions  - UA appears contaminated on admission, repeat showed present bacteria but negative nitrites and leukocytes   - WBC improved to 10.76 on 10/22, remains afebrile, and patient states urinary symptoms are intermittent   - No ABx at this time per Dr. Silva

## 2020-10-22 NOTE — PROGRESS NOTE ADULT - PROBLEM SELECTOR PLAN 10
Cholesterol: 298, Triglycerides: 178, HDL: 50, LDL: 212  - C/w home Atorvastatin 80mg QHS    VTE: Heparin SQ  Dispo: pending diuresis/NSTEMI med management    Case d/w Dr Silva

## 2020-10-22 NOTE — DIETITIAN INITIAL EVALUATION ADULT. - PERSON TAUGHT/METHOD
verbal instruction/patient instructed/CHF diet education provided. Discussed importance of daily weights, choosing low salt foods, following MD determined fluid restrictions. Reviewed foods high/low in salt. Understadning stated, provide additional motivation as needed.

## 2020-10-22 NOTE — DIETITIAN INITIAL EVALUATION ADULT. - DIET TYPE
Diet to be advanced in 24-48hrs: Low sodium, no conc K/Phos. Suggest 60gm prot Restriction PRN Pending %PO intake. FR per team. Glucerna x1/day (220kcal/10gm prot).

## 2020-10-22 NOTE — PROGRESS NOTE ADULT - ASSESSMENT
56 y/o Female, former smoker, CONTRAST INTOLERANCE (vomiting), with strong FamHx of CHF (mother and maternal aunt,  from CHF, age ~70s), and PMHx of Diastolic CHF (EF 55-60% by Echo on 10/21/20), HTN, HLD, IDDM, CKD Stage V, AOCD, multiple extended admission in 2020 for fluid overload per pt, and ?PAD (pt reports 60% blockage in left leg, but denies peripheral cath) who presented to [facility] ED on 10/21/20 c/o substernal chest pressure, radiating to left arm, 10/10 in severity, w/ associated SOB, dizziness, diaphoresis, and dry cough which started while making tea this AM. Pt is now admitted to [facility] for further management of acute on chronic diastolic CHF, HTN urgency, and r/o ACS. Troponins have progressed from 0.1->0.25->0.25->0.28->0.36. Patient was initiated on a heparin gtt around 8PM on 10/21. After extensive conversation with Nephrology team, Dr. Tinajero, and Dr. Silva, it was determined that patient would not go for cardiac catheterization and would be medically managed for her NSTEMI. Per Nephrology team, patient will not require initiation of dialysis during this admission. Patient's Brilinta was transitioned back to Plavix, heparin gtt will be continued to achieve 48 hours of therapy, Bumex was increased from BID to TID, and Coreg was increased to Coreg 12.5 mg BID. Patient was also ordered for Xanax 0.25 q8 hours PRN for anxiety.

## 2020-10-23 LAB
ANION GAP SERPL CALC-SCNC: 17 MMOL/L — SIGNIFICANT CHANGE UP (ref 5–17)
APTT BLD: 59 SEC — HIGH (ref 27.5–35.5)
BUN SERPL-MCNC: 74 MG/DL — HIGH (ref 7–23)
CALCIUM SERPL-MCNC: 9.2 MG/DL — SIGNIFICANT CHANGE UP (ref 8.4–10.5)
CHLORIDE SERPL-SCNC: 106 MMOL/L — SIGNIFICANT CHANGE UP (ref 96–108)
CK MB CFR SERPL CALC: 8.3 NG/ML — HIGH (ref 0–6.7)
CK MB CFR SERPL CALC: 9 NG/ML — HIGH (ref 0–6.7)
CK SERPL-CCNC: 161 U/L — SIGNIFICANT CHANGE UP (ref 25–170)
CK SERPL-CCNC: 169 U/L — SIGNIFICANT CHANGE UP (ref 25–170)
CO2 SERPL-SCNC: 14 MMOL/L — LOW (ref 22–31)
CREAT SERPL-MCNC: 5.29 MG/DL — HIGH (ref 0.5–1.3)
CULTURE RESULTS: SIGNIFICANT CHANGE UP
GLUCOSE BLDC GLUCOMTR-MCNC: 111 MG/DL — HIGH (ref 70–99)
GLUCOSE BLDC GLUCOMTR-MCNC: 124 MG/DL — HIGH (ref 70–99)
GLUCOSE BLDC GLUCOMTR-MCNC: 137 MG/DL — HIGH (ref 70–99)
GLUCOSE BLDC GLUCOMTR-MCNC: 167 MG/DL — HIGH (ref 70–99)
GLUCOSE SERPL-MCNC: 104 MG/DL — HIGH (ref 70–99)
HAV IGM SER-ACNC: SIGNIFICANT CHANGE UP
HBV CORE AB SER-ACNC: SIGNIFICANT CHANGE UP
HBV CORE IGM SER-ACNC: SIGNIFICANT CHANGE UP
HBV SURFACE AB SER-ACNC: SIGNIFICANT CHANGE UP
HBV SURFACE AG SER-ACNC: SIGNIFICANT CHANGE UP
HCT VFR BLD CALC: 29.2 % — LOW (ref 34.5–45)
HGB BLD-MCNC: 9.4 G/DL — LOW (ref 11.5–15.5)
MAGNESIUM SERPL-MCNC: 2 MG/DL — SIGNIFICANT CHANGE UP (ref 1.6–2.6)
MCHC RBC-ENTMCNC: 28.2 PG — SIGNIFICANT CHANGE UP (ref 27–34)
MCHC RBC-ENTMCNC: 32.2 GM/DL — SIGNIFICANT CHANGE UP (ref 32–36)
MCV RBC AUTO: 87.7 FL — SIGNIFICANT CHANGE UP (ref 80–100)
NRBC # BLD: 0 /100 WBCS — SIGNIFICANT CHANGE UP (ref 0–0)
PLATELET # BLD AUTO: 325 K/UL — SIGNIFICANT CHANGE UP (ref 150–400)
POTASSIUM SERPL-MCNC: 5.2 MMOL/L — SIGNIFICANT CHANGE UP (ref 3.5–5.3)
POTASSIUM SERPL-SCNC: 5.2 MMOL/L — SIGNIFICANT CHANGE UP (ref 3.5–5.3)
RBC # BLD: 3.33 M/UL — LOW (ref 3.8–5.2)
RBC # FLD: 16 % — HIGH (ref 10.3–14.5)
SODIUM SERPL-SCNC: 137 MMOL/L — SIGNIFICANT CHANGE UP (ref 135–145)
SPECIMEN SOURCE: SIGNIFICANT CHANGE UP
TROPONIN T SERPL-MCNC: 0.37 NG/ML — CRITICAL HIGH (ref 0–0.01)
TROPONIN T SERPL-MCNC: 0.41 NG/ML — CRITICAL HIGH (ref 0–0.01)
WBC # BLD: 9.04 K/UL — SIGNIFICANT CHANGE UP (ref 3.8–10.5)
WBC # FLD AUTO: 9.04 K/UL — SIGNIFICANT CHANGE UP (ref 3.8–10.5)

## 2020-10-23 PROCEDURE — 93970 EXTREMITY STUDY: CPT | Mod: 26

## 2020-10-23 PROCEDURE — 76770 US EXAM ABDO BACK WALL COMP: CPT | Mod: 26

## 2020-10-23 PROCEDURE — 99233 SBSQ HOSP IP/OBS HIGH 50: CPT

## 2020-10-23 RX ORDER — ACETAMINOPHEN 500 MG
650 TABLET ORAL EVERY 6 HOURS
Refills: 0 | Status: DISCONTINUED | OUTPATIENT
Start: 2020-10-23 | End: 2020-10-26

## 2020-10-23 RX ORDER — SENNA PLUS 8.6 MG/1
2 TABLET ORAL DAILY
Refills: 0 | Status: DISCONTINUED | OUTPATIENT
Start: 2020-10-23 | End: 2020-10-26

## 2020-10-23 RX ADMIN — Medication 30 MILLIGRAM(S): at 17:07

## 2020-10-23 RX ADMIN — Medication 4 UNIT(S): at 17:08

## 2020-10-23 RX ADMIN — BUMETANIDE 2 MILLIGRAM(S): 0.25 INJECTION INTRAMUSCULAR; INTRAVENOUS at 22:09

## 2020-10-23 RX ADMIN — SENNA PLUS 2 TABLET(S): 8.6 TABLET ORAL at 06:44

## 2020-10-23 RX ADMIN — BUDESONIDE AND FORMOTEROL FUMARATE DIHYDRATE 2 PUFF(S): 160; 4.5 AEROSOL RESPIRATORY (INHALATION) at 06:19

## 2020-10-23 RX ADMIN — Medication 30 MILLIGRAM(S): at 06:20

## 2020-10-23 RX ADMIN — Medication 4 UNIT(S): at 11:56

## 2020-10-23 RX ADMIN — Medication 1: at 17:08

## 2020-10-23 RX ADMIN — Medication 81 MILLIGRAM(S): at 11:55

## 2020-10-23 RX ADMIN — INSULIN GLARGINE 12 UNIT(S): 100 INJECTION, SOLUTION SUBCUTANEOUS at 22:09

## 2020-10-23 RX ADMIN — Medication 667 MILLIGRAM(S): at 11:55

## 2020-10-23 RX ADMIN — Medication 650 MILLIGRAM(S): at 13:34

## 2020-10-23 RX ADMIN — Medication 650 MILLIGRAM(S): at 22:10

## 2020-10-23 RX ADMIN — BUDESONIDE AND FORMOTEROL FUMARATE DIHYDRATE 2 PUFF(S): 160; 4.5 AEROSOL RESPIRATORY (INHALATION) at 17:09

## 2020-10-23 RX ADMIN — Medication 650 MILLIGRAM(S): at 18:12

## 2020-10-23 RX ADMIN — Medication 650 MILLIGRAM(S): at 06:20

## 2020-10-23 RX ADMIN — BUMETANIDE 2 MILLIGRAM(S): 0.25 INJECTION INTRAMUSCULAR; INTRAVENOUS at 06:19

## 2020-10-23 RX ADMIN — Medication 667 MILLIGRAM(S): at 17:07

## 2020-10-23 RX ADMIN — BUMETANIDE 2 MILLIGRAM(S): 0.25 INJECTION INTRAMUSCULAR; INTRAVENOUS at 13:34

## 2020-10-23 RX ADMIN — Medication 4 UNIT(S): at 07:52

## 2020-10-23 RX ADMIN — Medication 667 MILLIGRAM(S): at 07:52

## 2020-10-23 RX ADMIN — Medication 5 MILLIGRAM(S): at 22:10

## 2020-10-23 RX ADMIN — ATORVASTATIN CALCIUM 80 MILLIGRAM(S): 80 TABLET, FILM COATED ORAL at 22:09

## 2020-10-23 RX ADMIN — Medication 650 MILLIGRAM(S): at 17:12

## 2020-10-23 RX ADMIN — CARVEDILOL PHOSPHATE 12.5 MILLIGRAM(S): 80 CAPSULE, EXTENDED RELEASE ORAL at 06:20

## 2020-10-23 RX ADMIN — CARVEDILOL PHOSPHATE 12.5 MILLIGRAM(S): 80 CAPSULE, EXTENDED RELEASE ORAL at 17:07

## 2020-10-23 RX ADMIN — CLOPIDOGREL BISULFATE 75 MILLIGRAM(S): 75 TABLET, FILM COATED ORAL at 11:55

## 2020-10-23 NOTE — PROGRESS NOTE ADULT - PROBLEM SELECTOR PLAN 8
Hgb 9.2 on admission, likely 2/2 AOCD from CKD Stage V  - irons studies normal   - Hgb 9.4 on 10/22   - Maintain active T/S  - continue to monitor CBC

## 2020-10-23 NOTE — PROGRESS NOTE ADULT - SUBJECTIVE AND OBJECTIVE BOX
Patient is a 55y Female seen and evaluated at bedside.     c/o chronic leg pain and mild CP   feels otherwise well  no SOB   BP elevated   UOP adequate       Meds:    ALPRAZolam 0.25 every 8 hours PRN  aluminum hydroxide/magnesium hydroxide/simethicone Suspension 30 every 6 hours PRN  aspirin enteric coated 81 daily  atorvastatin 80 at bedtime  budesonide  80 MICROgram(s)/formoterol 4.5 MICROgram(s) Inhaler 2 two times a day  buMETAnide Injectable 2 every 8 hours  calcium acetate 667 three times a day with meals  carvedilol 12.5 every 12 hours  clopidogrel Tablet 75 daily  dextrose 40% Gel 15 once PRN  dextrose 40% Gel 15 once PRN  dextrose 5%. 1000 <Continuous>  dextrose 50% Injectable 12.5 once  dextrose 50% Injectable 25 once  dextrose 50% Injectable 25 once  glucagon  Injectable 1 once PRN  heparin   Injectable 4400 every 6 hours PRN  heparin  Infusion. 1050 <Continuous>  influenza   Vaccine 0.5 once  insulin glargine Injectable (LANTUS) 12 at bedtime  insulin lispro (ADMELOG) corrective regimen sliding scale  Before meals and at bedtime  insulin lispro Injectable (ADMELOG) 4 three times a day before meals  levalbuterol Inhalation 0.63 every 6 hours PRN  melatonin 5 at bedtime  NIFEdipine XL 30 two times a day  senna 2 daily  sodium bicarbonate 650 three times a day      T(C): , Max: 37.1 (10-23-20 @ 06:15)  T(F): , Max: 98.7 (10-23-20 @ 06:15)  HR: 98 (10-23-20 @ 08:40)  BP: 147/70 (10-23-20 @ 08:40)  BP(mean): 99 (10-23-20 @ 05:09)  RR: 18 (10-23-20 @ 08:40)  SpO2: 97% (10-23-20 @ 08:40)  Wt(kg): --    10-22 @ 07:01  -  10-23 @ 07:00  --------------------------------------------------------  IN: 820.5 mL / OUT: 1900 mL / NET: -1079.5 mL    10-23 @ 07:01  -  10-23 @ 11:08  --------------------------------------------------------  IN: 180 mL / OUT: 200 mL / NET: -20 mL          Review of Systems:  CONSTITUTIONAL: No fever or chills.  RESPIRATORY: no shortness of breath  CARDIOVASCULAR: +Chest pain  GASTROINTESTINAL: No abdominal pain, nausea, vomiting, diarrhea  MUSCULOSKELETAL: No swelling, +chronic LE pain       PHYSICAL EXAM:  GENERAL: Alert, awake, oriented x3 on RA breathing comfortably   CHEST/LUNG: Bilateral clear breath sounds  HEART: Regular rate and rhythm, no murmur, no gallops, no rub   ABDOMEN: Soft, nontender, non distended  EXTREMITIES: no pedal edema      LABS:                        9.4    9.04  )-----------( 325      ( 23 Oct 2020 07:02 )             29.2     10-23    137  |  106  |  74<H>  ----------------------------<  104<H>  5.2   |  14<L>  |  5.29<H>    Ca    9.2      23 Oct 2020 07:02  Phos  4.4     10-22  Mg     2.0     10-23    TPro  6.6  /  Alb  3.5  /  TBili  <0.2  /  DBili  x   /  AST  20  /  ALT  18  /  AlkPhos  138<H>  10-22    Hepatitis B Surface Antibody: Nonreact (10-23 @ 07:02)    PT/INR - ( 21 Oct 2020 14:44 )   PT: 11.1 sec;   INR: 0.92          PTT - ( 23 Oct 2020 07:02 )  PTT:59.0 sec  Urinalysis Basic - ( 21 Oct 2020 23:09 )    Color: Yellow / Appearance: Clear / S.020 / pH: x  Gluc: x / Ketone: NEGATIVE  / Bili: Negative / Urobili: 0.2 E.U./dL   Blood: x / Protein: 100 mg/dL / Nitrite: NEGATIVE   Leuk Esterase: NEGATIVE / RBC: < 5 /HPF / WBC < 5 /HPF   Sq Epi: x / Non Sq Epi: 5-10 /HPF / Bacteria: Present /HPF      Osmolality, Random Urine: 310 mosm/kg (10-21 @ 23:09)  Sodium, Random Urine: 92 mmol/L (10-21 @ 23:09)  Creatinine, Random Urine: 30 mg/dL (10-21 @ 23:09)  Protein/Creatinine Ratio Calculation: >6.7 Ratio (10-21 @ 23:09)        RADIOLOGY & ADDITIONAL STUDIES:

## 2020-10-23 NOTE — PROGRESS NOTE ADULT - PROBLEM SELECTOR PLAN 7
D-Dimer 713 on admission, currently satting well on 2-3L, non-tachypneic, borderline tachycardic. B/L calves TTP  - LE duplex ordered, performed, awaiting results   - No CTA Chest at this time per Dr Silva given CKD stage V. Consider VQ scan if suspicious for PE

## 2020-10-23 NOTE — PROGRESS NOTE ADULT - ASSESSMENT
56 y/o Female, former smoker, CONTRAST INTOLERANCE (vomiting), with strong FamHx of CHF (mother and maternal aunt,  from CHF, age ~70s), and PMHx of Diastolic CHF (EF 55-60% by Echo on 10/21/20), HTN, HLD, IDDM, CKD Stage V, AOCD, multiple extended admission in 2020 for fluid overload per pt, and ?PAD (pt reports 60% blockage in left leg, but denies peripheral cath) who presented to [facility] ED on 10/21/20 c/o substernal chest pressure, radiating to left arm, 10/10 in severity, w/ associated SOB, dizziness, diaphoresis, and dry cough. Pt is now admitted to [facility] for further management of acute on chronic diastolic CHF, HTN urgency, and r/o ACS. Troponins have progressed from 0.1 -> 0.25 -> 0.25 -> 0.28 -> 0.36 -> 0.39 -> 0.37 -> 0.41, no longer trending. Patient was reinitiated on a heparin gtt around 8PM on 10/21. After extensive conversation with Nephrology team, Dr. Tinajero, and Dr. Silva, it was determined that patient would not go for cardiac catheterization and would be medically managed for her NSTEMI. Per Nephrology team, patient will not require initiation of dialysis during this admission. Patient's Brilinta was transitioned back to Plavix, heparin gtt will be continued to achieve 48 hours of therapy, to be completed on 10/23 evening. Patient is being diuresed with Bumex 2 mg TID with a plan to transition to Torsemide 40 mg daily on 10/24/2020. Patient's BP better controlled with current Coreg and Nifedipine.

## 2020-10-23 NOTE — PROGRESS NOTE ADULT - PROBLEM SELECTOR PLAN 4
RESOLVED; WBC 18.46 on admission, no left shift. Currently afebrile, but endorses dysuria  - CXR right basilar opacity and b/l pleural effusions  - UA appears contaminated on admission, repeat showed present bacteria but negative nitrites and leukocytes   - patient states urinary symptoms are intermittent   - WBC improved to 9.04 on 10/24, patient remains afebrile   - No ABx at this time per Dr. Silva

## 2020-10-23 NOTE — PROGRESS NOTE ADULT - PROBLEM SELECTOR PLAN 3
SBPs currently rangin 120's to 160's   - Ordered for Bumex 2 mg IV TID, Coreg 12.5 mg BID, and Nifedipine 30 mg BID  - transitioning to Torsemide 40 mg daily on 10/24   - continue to monitor

## 2020-10-23 NOTE — PROGRESS NOTE ADULT - ATTENDING COMMENTS
55F w/ dCHF, CKD V, poorly controlled HTN, DM and HLD p/w acute on chronic dCHF exacerbation    -dCHF - Grade III Diastolic dysfunction, poor renal fxn and poorly controlled BP - c/w Bumex 2mg IV TID, diuresing well - appears near euvolemic, will transition to Torsemide 40 PO tmrw, c/w Coreg 12.5 BID and Nifedipine 30 BID; Will consider increase of Coreg to 25 BID first if more BP control needed  -CAD - Mild chest discomfort on admission now resolved, EKG changes c/w LVH and repol abnormalities, Mild trop leak in the setting of CKD and CHF likely 2/2 Type II MI and TTE w/ normal EF 55-60% and no WMA - Will complete Heparin gtt for 48hrs this evening; Will c/w medical mgmt of presumed CAD; Will continue ASA/Plavix and Lipitor upon discharge  -Renal - Advanced CKD - Pt. aware of the likely need for HD in her future but no indication for urgent HD at this time. Currently responding well to IV diuresis w/ Bumex 2mg IV TID - transition to Torsemide 40 PO daily tmrw; Renal following; Will continue to manage BP - goal SBPs 130s-140s; Avoid any nephrotoxic agents; Pt. will need to f/u w/ her outpatient nephrologist closely  -DASH/Renal diet; Pt. counseled on low salt diet   -DVT PPx  -OOB to chair  -Dispo: Cardiac Tele; Likely d/c home tmrw or Sunday  -Full Code    Juanpablo Silva MD  Cardiology Attending

## 2020-10-23 NOTE — PROGRESS NOTE ADULT - ASSESSMENT
55F CKD V (venous mapping completed 2 weeks ago), DM, CHF, DM, emphysema p/w shortness of breath. Nephrology consulted for MARIUM.     Assessment/Plan:   #HTN urgency   #hyperkalemia  #MARIUM on CKD, possible acute hypoxic respiratory failure, possible acute CHF exacerbation   unclear baseline creatinine  possibly MARIUM on CKD vs baseline CKD vs cardiorenal syndrome     Recommend:   daily CXR   transition to home dose PO lasix 40mg q24h   continue with Lokelma 10g q24h PRN   continue with sodium bicarb 650mg TID   consider urine lytes (Na, K, Cl, Cr, Phos) to r/o RTA given persistently low bicarb, may defer to out-patient work-up   resume home anti-HTN PRN   renal sono pending   strict I/Os   renal diet     No acute indication for hemodialysis; nephrologically stable for discharge with out-patient follow-up.     Thank you for the opportunity to participate in the care of your patient. The nephrology service remains available to assist with any questions or concerns. Please feel free to reach us by paging the on-call nephrology fellow for urgent issues or as below.     Isaias Saba M.D.   PGY-4, Nephrology Fellow   C: [phone]   P: [phone]

## 2020-10-23 NOTE — PROGRESS NOTE ADULT - PROBLEM SELECTOR PLAN 1
+JVD, bibasilar crackles, satting well on 2L, no tachypnea or respiratory distress on admission  - CXR reveals b/l pleural effusions, right basilar opacity on admission, repeat CXR 10/22 improved   - Echo revealed EF 55-60%, grade III diastolic dysfunction, moderate MR, moderately dilated LA, RV free wall mildly hypertrophied. Per Echo read findings are c/w infiltrative dz like amyloid, but Dr Silva personally reviewed Echo and does not consider findings c/w amyloid  - continuing Bumex 2 mg TID, transition to Torsemide 40 mg daily on 10/24  - continuing Coreg 12.5 mg BID and Nifedipine 30mg BID (on Labetalol 200mg BID and Nifedipine 90mg QD at home)  - C/w home Symbicort and Xopenex PRN for SOB/wheezing  - Core measures, strict I/Os, daily weights, 1L fluid restriction

## 2020-10-23 NOTE — PROGRESS NOTE ADULT - PROBLEM SELECTOR PLAN 5
BUN/Cr 74/4.94 on admission, GFR 10. Cr 5.1 n September per discharge paperwork w/ pt  - BUN/Cr 74/5.29 on 10/23   - Renal consulted, appreciate recs  - Renal US ordered, performed, pending results   - C/w home Calcium Acetate 667mg TID and Sodium Bicarb 650mg TID  - per Renal, patient will not need initiation of dialysis during this admission

## 2020-10-23 NOTE — PROGRESS NOTE ADULT - PROBLEM SELECTOR PLAN 6
K 5.9 on admission, s/p Calcium Gluconate 1g IV x 1 and Lokelma 10g PO x 1 in ED  - RESOLVED   - K 5.2 on 10/23 AM labs   - continue to monitor daily BMP's

## 2020-10-23 NOTE — PROGRESS NOTE ADULT - PROBLEM SELECTOR PLAN 2
Currently CP free and HD stable  - Pt given ASA 162mg PO x 1 by EMS and 162mg PO x 1 by ED attending. Also loaded w/ Brilinta 180mg PO x 1 in ED and started on Heparin gtt. Per Dr. Silva, d/c Heparin gtt and Brilinta at this time given suspicion that Troponin leak likely 2/2 demand ischemia upon admission   - Trop 0.1 -> 0.25 -> 0.25 -> 0.28 -> 0.36 -> 0.39 -> 0.37 -> 0.41  - no longer trending as per Dr. Silva   - restarted heparin gtt around 8 PM 10/21, continue to achieve 48 hours of therapy, to complete 10/23 evening   - EKG Sinus Tach @ 128BPM, LVH w/ strain pattern upon admission   - repeat EKG unchanged from admission   - Brilinta transitioned to Plavix on 10/22  - continue ASA 81 mg daily and Plavix 75 mg daily   - deemed patient would not undergo cardiac cath, continue with medical management

## 2020-10-23 NOTE — PROGRESS NOTE ADULT - SUBJECTIVE AND OBJECTIVE BOX
Interventional Cardiology PA Adult Progress Note  INCOMPLETE     C.C.: Chest Pain     Subjective Assessment: Patient seen and examined at bedside this morning. Patient reports .....       ROS Negative except as per Subjective and HPI  	  MEDICATIONS:  buMETAnide Injectable 2 milliGRAM(s) IV Push every 8 hours  carvedilol 12.5 milliGRAM(s) Oral every 12 hours  NIFEdipine XL 30 milliGRAM(s) Oral two times a day      budesonide  80 MICROgram(s)/formoterol 4.5 MICROgram(s) Inhaler 2 Puff(s) Inhalation two times a day  levalbuterol Inhalation 0.63 milliGRAM(s) Inhalation every 6 hours PRN    ALPRAZolam 0.25 milliGRAM(s) Oral every 8 hours PRN  melatonin 5 milliGRAM(s) Oral at bedtime    aluminum hydroxide/magnesium hydroxide/simethicone Suspension 30 milliLiter(s) Oral every 6 hours PRN  senna 2 Tablet(s) Oral daily    atorvastatin 80 milliGRAM(s) Oral at bedtime  dextrose 40% Gel 15 Gram(s) Oral once PRN  dextrose 40% Gel 15 Gram(s) Oral once PRN  dextrose 50% Injectable 12.5 Gram(s) IV Push once  dextrose 50% Injectable 25 Gram(s) IV Push once  dextrose 50% Injectable 25 Gram(s) IV Push once  glucagon  Injectable 1 milliGRAM(s) IntraMuscular once PRN  insulin glargine Injectable (LANTUS) 12 Unit(s) SubCutaneous at bedtime  insulin lispro (ADMELOG) corrective regimen sliding scale   SubCutaneous Before meals and at bedtime  insulin lispro Injectable (ADMELOG) 4 Unit(s) SubCutaneous three times a day before meals    aspirin enteric coated 81 milliGRAM(s) Oral daily  calcium acetate 667 milliGRAM(s) Oral three times a day with meals  clopidogrel Tablet 75 milliGRAM(s) Oral daily  dextrose 5%. 1000 milliLiter(s) IV Continuous <Continuous>  heparin   Injectable 4400 Unit(s) IV Push every 6 hours PRN  heparin  Infusion. 1050 Unit(s)/Hr IV Continuous <Continuous>  influenza   Vaccine 0.5 milliLiter(s) IntraMuscular once  sodium bicarbonate 650 milliGRAM(s) Oral three times a day      	    [PHYSICAL EXAM:  TELEMETRY:  T(C): 36.7 (10-23-20 @ 09:56), Max: 37.1 (10-23-20 @ 06:15)  HR: 98 (10-23-20 @ 08:40) (86 - 103)  BP: 147/70 (10-23-20 @ 08:40) (127/62 - 168/77)  RR: 18 (10-23-20 @ 08:40) (18 - 18)  SpO2: 97% (10-23-20 @ 08:40) (97% - 98%)  Wt(kg): --  I&O's Summary    22 Oct 2020 07:01  -  23 Oct 2020 07:00  --------------------------------------------------------  IN: 820.5 mL / OUT: 1900 mL / NET: -1079.5 mL    23 Oct 2020 07:01  -  23 Oct 2020 11:16  --------------------------------------------------------  IN: 222 mL / OUT: 400 mL / NET: -178 mL        [place]:  Central/PICC/Mid Line:                                         Appearance: Normal	  HEENT:   Normal oral mucosa, PERRL, EOMI	  Neck: Supple, + JVD/ - JVD; Carotid Bruit   Cardiovascular: Normal S1 S2, No JVD, No murmurs,   Respiratory: Lungs clear to auscultation/Decreased Breath Sounds/No Rales, Rhonchi, Wheezing	  Gastrointestinal:  Soft, Non-tender, + BS	  Skin: No rashes, No ecchymoses, No cyanosis  Extremities: Normal range of motion, No clubbing, cyanosis or edema  Vascular: Peripheral pulses palpable 2+ bilaterally  Neurologic: Non-focal  Psychiatry: A & O x 3, Mood & affect appropriate      	    ECG:  	  RADIOLOGY:   DIAGNOSTIC TESTING:  [ ] Echocardiogram:  [ ]  Catheterization:  [ ] Stress Test:    [ ] YANICK  OTHER: 	    LABS:	 	  CARDIAC MARKERS:                                  9.4    9.04  )-----------( 325      ( 23 Oct 2020 07:02 )             29.2     10-23    137  |  106  |  74<H>  ----------------------------<  104<H>  5.2   |  14<L>  |  5.29<H>    Ca    9.2      23 Oct 2020 07:02  Phos  4.4     10-22  Mg     2.0     10-23    TPro  6.6  /  Alb  3.5  /  TBili  <0.2  /  DBili  x   /  AST  20  /  ALT  18  /  AlkPhos  138<H>  10-22    proBNP:   Lipid Profile:   HgA1c:   TSH:   PT/INR - ( 21 Oct 2020 14:44 )   PT: 11.1 sec;   INR: 0.92          PTT - ( 23 Oct 2020 07:02 )  PTT:59.0 sec    ASSESSMENT/PLAN: 	        DVT ppx:  Dispo:     Interventional Cardiology PA Adult Progress Note    C.C.: Chest Pain     Subjective Assessment: Patient seen and examined at bedside this morning. Patient reports she is having some chest discomfort, but states that she will burp and feel water in her throat and then her throat will "burn". Patient denies any other complaints at time of exam.     ROS Negative except as per Subjective and HPI  	  MEDICATIONS:  buMETAnide Injectable 2 milliGRAM(s) IV Push every 8 hours  carvedilol 12.5 milliGRAM(s) Oral every 12 hours  NIFEdipine XL 30 milliGRAM(s) Oral two times a day  budesonide  80 MICROgram(s)/formoterol 4.5 MICROgram(s) Inhaler 2 Puff(s) Inhalation two times a day  levalbuterol Inhalation 0.63 milliGRAM(s) Inhalation every 6 hours PRN  ALPRAZolam 0.25 milliGRAM(s) Oral every 8 hours PRN  melatonin 5 milliGRAM(s) Oral at bedtime  aluminum hydroxide/magnesium hydroxide/simethicone Suspension 30 milliLiter(s) Oral every 6 hours PRN  senna 2 Tablet(s) Oral daily  atorvastatin 80 milliGRAM(s) Oral at bedtime  dextrose 40% Gel 15 Gram(s) Oral once PRN  dextrose 40% Gel 15 Gram(s) Oral once PRN  dextrose 50% Injectable 12.5 Gram(s) IV Push once  dextrose 50% Injectable 25 Gram(s) IV Push once  dextrose 50% Injectable 25 Gram(s) IV Push once  glucagon  Injectable 1 milliGRAM(s) IntraMuscular once PRN  insulin glargine Injectable (LANTUS) 12 Unit(s) SubCutaneous at bedtime  insulin lispro (ADMELOG) corrective regimen sliding scale   SubCutaneous Before meals and at bedtime  insulin lispro Injectable (ADMELOG) 4 Unit(s) SubCutaneous three times a day before meals  aspirin enteric coated 81 milliGRAM(s) Oral daily  calcium acetate 667 milliGRAM(s) Oral three times a day with meals  clopidogrel Tablet 75 milliGRAM(s) Oral daily  dextrose 5%. 1000 milliLiter(s) IV Continuous <Continuous>  heparin   Injectable 4400 Unit(s) IV Push every 6 hours PRN  heparin  Infusion. 1050 Unit(s)/Hr IV Continuous <Continuous>  influenza   Vaccine 0.5 milliLiter(s) IntraMuscular once  sodium bicarbonate 650 milliGRAM(s) Oral three times a day      PHYSICAL EXAM:  TELEMETRY: No overnight events.   T(C): 36.7 (10-23-20 @ 09:56), Max: 37.1 (10-23-20 @ 06:15)  HR: 98 (10-23-20 @ 08:40) (86 - 103)  BP: 147/70 (10-23-20 @ 08:40) (127/62 - 168/77)  RR: 18 (10-23-20 @ 08:40) (18 - 18)  SpO2: 97% (10-23-20 @ 08:40) (97% - 98%)  Wt(kg): --  I&O's Summary    22 Oct 2020 07:01  -  23 Oct 2020 07:00  --------------------------------------------------------  IN: 820.5 mL / OUT: 1900 mL / NET: -1079.5 mL    23 Oct 2020 07:01  -  23 Oct 2020 11:16  --------------------------------------------------------  IN: 222 mL / OUT: 400 mL / NET: -178 mL                                         Appearance: NAD	  HEENT: Normal oral mucosa, PERRL, EOMI	  Neck: Supple, - JVD  Cardiovascular: Normal S1/S2, No JVD, + systolic murmur   Respiratory: Lungs clear to auscultation, No Rales, Rhonchi, Wheezing	  Gastrointestinal:  Soft, Non-tender	  Skin: No rashes, No ecchymoses, No cyanosis  Extremities: Normal range of motion, No clubbing, cyanosis or edema  Vascular: Peripheral pulses palpable 2+ bilaterally  Neurologic: Non-focal  Psychiatry: A & O x 3, Mood & affect appropriate    	    ECG:  	  RADIOLOGY:   DIAGNOSTIC TESTING:  [ ] Echocardiogram:  [ ] Catheterization:  [ ] Stress Test:    [ ] YANICK   	    LABS:	 	  CARDIAC MARKERS ( 23 Oct 2020 07:02 )  x     / 0.41 ng/mL / 161 U/L / x     / 8.3 ng/mL  CARDIAC MARKERS ( 23 Oct 2020 00:27 )  x     / 0.37 ng/mL / 169 U/L / x     / 9.0 ng/mL  CARDIAC MARKERS ( 22 Oct 2020 18:58 )  x     / 0.39 ng/mL / 202 U/L / x     / 10.3 ng/mL  CARDIAC MARKERS ( 22 Oct 2020 12:40 )  x     / 0.36 ng/mL / 227 U/L / x     / 11.9 ng/mL  CARDIAC MARKERS ( 22 Oct 2020 05:44 )  x     / 0.28 ng/mL / 229 U/L / x     / 12.1 ng/mL  CARDIAC MARKERS ( 21 Oct 2020 23:03 )  x     / 0.25 ng/mL / 239 U/L / x     / 12.5 ng/mL  CARDIAC MARKERS ( 21 Oct 2020 19:09 )  x     / 0.25 ng/mL / 249 U/L / x     / 13.0 ng/mL                    9.4    9.04  )-----------( 325      ( 23 Oct 2020 07:02 )             29.2     10-23    137  |  106  |  74<H>  ----------------------------<  104<H>  5.2   |  14<L>  |  5.29<H>    Ca    9.2      23 Oct 2020 07:02  Phos  4.4     10-22  Mg     2.0     10-23    TPro  6.6  /  Alb  3.5  /  TBili  <0.2  /  DBili  x   /  AST  20  /  ALT  18  /  AlkPhos  138<H>  10-22    PT/INR - ( 21 Oct 2020 14:44 )   PT: 11.1 sec;   INR: 0.92        PTT - ( 23 Oct 2020 07:02 )  PTT:59.0 sec

## 2020-10-24 LAB
ANION GAP SERPL CALC-SCNC: 15 MMOL/L — SIGNIFICANT CHANGE UP (ref 5–17)
BUN SERPL-MCNC: 76 MG/DL — HIGH (ref 7–23)
CALCIUM SERPL-MCNC: 9.3 MG/DL — SIGNIFICANT CHANGE UP (ref 8.4–10.5)
CHLORIDE SERPL-SCNC: 104 MMOL/L — SIGNIFICANT CHANGE UP (ref 96–108)
CO2 SERPL-SCNC: 17 MMOL/L — LOW (ref 22–31)
CREAT SERPL-MCNC: 5.72 MG/DL — HIGH (ref 0.5–1.3)
GAMMA INTERFERON BACKGROUND BLD IA-ACNC: 0.01 IU/ML — SIGNIFICANT CHANGE UP
GLUCOSE BLDC GLUCOMTR-MCNC: 115 MG/DL — HIGH (ref 70–99)
GLUCOSE BLDC GLUCOMTR-MCNC: 119 MG/DL — HIGH (ref 70–99)
GLUCOSE BLDC GLUCOMTR-MCNC: 231 MG/DL — HIGH (ref 70–99)
GLUCOSE BLDC GLUCOMTR-MCNC: 89 MG/DL — SIGNIFICANT CHANGE UP (ref 70–99)
GLUCOSE SERPL-MCNC: 96 MG/DL — SIGNIFICANT CHANGE UP (ref 70–99)
HCT VFR BLD CALC: 27.6 % — LOW (ref 34.5–45)
HGB BLD-MCNC: 9 G/DL — LOW (ref 11.5–15.5)
M TB IFN-G BLD-IMP: ABNORMAL
M TB IFN-G CD4+ BCKGRND COR BLD-ACNC: 0 IU/ML — SIGNIFICANT CHANGE UP
M TB IFN-G CD4+CD8+ BCKGRND COR BLD-ACNC: 0 IU/ML — SIGNIFICANT CHANGE UP
MAGNESIUM SERPL-MCNC: 1.9 MG/DL — SIGNIFICANT CHANGE UP (ref 1.6–2.6)
MCHC RBC-ENTMCNC: 28.7 PG — SIGNIFICANT CHANGE UP (ref 27–34)
MCHC RBC-ENTMCNC: 32.6 GM/DL — SIGNIFICANT CHANGE UP (ref 32–36)
MCV RBC AUTO: 87.9 FL — SIGNIFICANT CHANGE UP (ref 80–100)
NRBC # BLD: 0 /100 WBCS — SIGNIFICANT CHANGE UP (ref 0–0)
PLATELET # BLD AUTO: 308 K/UL — SIGNIFICANT CHANGE UP (ref 150–400)
POTASSIUM SERPL-MCNC: 5.2 MMOL/L — SIGNIFICANT CHANGE UP (ref 3.5–5.3)
POTASSIUM SERPL-SCNC: 5.2 MMOL/L — SIGNIFICANT CHANGE UP (ref 3.5–5.3)
QUANT TB PLUS MITOGEN MINUS NIL: 0.27 IU/ML — SIGNIFICANT CHANGE UP
RBC # BLD: 3.14 M/UL — LOW (ref 3.8–5.2)
RBC # FLD: 15.9 % — HIGH (ref 10.3–14.5)
SODIUM SERPL-SCNC: 136 MMOL/L — SIGNIFICANT CHANGE UP (ref 135–145)
WBC # BLD: 8.03 K/UL — SIGNIFICANT CHANGE UP (ref 3.8–10.5)
WBC # FLD AUTO: 8.03 K/UL — SIGNIFICANT CHANGE UP (ref 3.8–10.5)

## 2020-10-24 PROCEDURE — 71045 X-RAY EXAM CHEST 1 VIEW: CPT | Mod: 26

## 2020-10-24 PROCEDURE — 99233 SBSQ HOSP IP/OBS HIGH 50: CPT

## 2020-10-24 RX ORDER — CARVEDILOL PHOSPHATE 80 MG/1
25 CAPSULE, EXTENDED RELEASE ORAL EVERY 12 HOURS
Refills: 0 | Status: DISCONTINUED | OUTPATIENT
Start: 2020-10-24 | End: 2020-10-26

## 2020-10-24 RX ORDER — HEPARIN SODIUM 5000 [USP'U]/ML
5000 INJECTION INTRAVENOUS; SUBCUTANEOUS EVERY 8 HOURS
Refills: 0 | Status: DISCONTINUED | OUTPATIENT
Start: 2020-10-24 | End: 2020-10-26

## 2020-10-24 RX ADMIN — Medication 650 MILLIGRAM(S): at 13:27

## 2020-10-24 RX ADMIN — Medication 81 MILLIGRAM(S): at 11:26

## 2020-10-24 RX ADMIN — CARVEDILOL PHOSPHATE 25 MILLIGRAM(S): 80 CAPSULE, EXTENDED RELEASE ORAL at 17:31

## 2020-10-24 RX ADMIN — Medication 667 MILLIGRAM(S): at 07:59

## 2020-10-24 RX ADMIN — Medication 2: at 22:11

## 2020-10-24 RX ADMIN — Medication 5 MILLIGRAM(S): at 06:21

## 2020-10-24 RX ADMIN — Medication 30 MILLIGRAM(S): at 17:32

## 2020-10-24 RX ADMIN — Medication 667 MILLIGRAM(S): at 12:42

## 2020-10-24 RX ADMIN — Medication 667 MILLIGRAM(S): at 17:30

## 2020-10-24 RX ADMIN — CLOPIDOGREL BISULFATE 75 MILLIGRAM(S): 75 TABLET, FILM COATED ORAL at 11:26

## 2020-10-24 RX ADMIN — Medication 40 MILLIGRAM(S): at 06:22

## 2020-10-24 RX ADMIN — BUDESONIDE AND FORMOTEROL FUMARATE DIHYDRATE 2 PUFF(S): 160; 4.5 AEROSOL RESPIRATORY (INHALATION) at 06:21

## 2020-10-24 RX ADMIN — INSULIN GLARGINE 12 UNIT(S): 100 INJECTION, SOLUTION SUBCUTANEOUS at 22:12

## 2020-10-24 RX ADMIN — Medication 650 MILLIGRAM(S): at 09:23

## 2020-10-24 RX ADMIN — SENNA PLUS 2 TABLET(S): 8.6 TABLET ORAL at 11:27

## 2020-10-24 RX ADMIN — CARVEDILOL PHOSPHATE 12.5 MILLIGRAM(S): 80 CAPSULE, EXTENDED RELEASE ORAL at 06:21

## 2020-10-24 RX ADMIN — Medication 650 MILLIGRAM(S): at 08:46

## 2020-10-24 RX ADMIN — Medication 30 MILLIGRAM(S): at 06:22

## 2020-10-24 RX ADMIN — HEPARIN SODIUM 5000 UNIT(S): 5000 INJECTION INTRAVENOUS; SUBCUTANEOUS at 18:49

## 2020-10-24 RX ADMIN — Medication 5 MILLIGRAM(S): at 22:11

## 2020-10-24 RX ADMIN — Medication 650 MILLIGRAM(S): at 22:11

## 2020-10-24 RX ADMIN — BUDESONIDE AND FORMOTEROL FUMARATE DIHYDRATE 2 PUFF(S): 160; 4.5 AEROSOL RESPIRATORY (INHALATION) at 17:31

## 2020-10-24 RX ADMIN — Medication 4 UNIT(S): at 08:07

## 2020-10-24 RX ADMIN — ATORVASTATIN CALCIUM 80 MILLIGRAM(S): 80 TABLET, FILM COATED ORAL at 22:11

## 2020-10-24 RX ADMIN — Medication 650 MILLIGRAM(S): at 06:22

## 2020-10-24 NOTE — PROGRESS NOTE ADULT - PROBLEM SELECTOR PLAN 10
Cholesterol: 298, Triglycerides: 178, HDL: 50, LDL: 212  - C/w home Atorvastatin 80mg QHS    VTE: Heparin SQ  Dispo: pending diuresis/NSTEMI med management    Case d/w Dr Silva Cholesterol: 298, Triglycerides: 178, HDL: 50, LDL: 212  - C/w home Atorvastatin 80mg QHS    VTE: Heparin SQ  Dispo: pending diuresis/NSTEMI med management    Case d/w Dr Dill

## 2020-10-24 NOTE — PROGRESS NOTE ADULT - PROBLEM SELECTOR PLAN 7
D-Dimer 713 on admission, currently satting well on 2-3L, non-tachypneic, borderline tachycardic. B/L calves TTP  - LE duplex ordered, performed, awaiting results   - No CTA Chest at this time per Dr Silva given CKD stage V. Consider VQ scan if suspicious for PE D-Dimer 713 on admission, currently satting well on RA, non-tachypneic, borderline tachycardic. B/L calves TTP  - LE duplex 10/23 negative for DVT  - No CTA Chest at this time given CKD stage V. Consider VQ scan if suspicious for PE

## 2020-10-24 NOTE — PROGRESS NOTE ADULT - PROBLEM SELECTOR PLAN 6
K 5.9 on admission, s/p Calcium Gluconate 1g IV x 1 and Lokelma 10g PO x 1 in ED  - RESOLVED   - K 5.2 on 10/23 AM labs   - continue to monitor daily BMP's K 5.9 on admission, s/p Calcium Gluconate 1g IV x 1 and Lokelma 10g PO x 1 in ED  - RESOLVED   - K 5.2 on 10/24 AM labs   - continue to monitor daily BMP's

## 2020-10-24 NOTE — PROGRESS NOTE ADULT - PROBLEM SELECTOR PLAN 5
BUN/Cr 74/4.94 on admission, GFR 10. Cr 5.1 n September per discharge paperwork w/ pt  - BUN/Cr 74/5.29 on 10/23   - Renal consulted, appreciate recs  - Renal US ordered, performed, pending results   - C/w home Calcium Acetate 667mg TID and Sodium Bicarb 650mg TID  - per Renal, patient will not need initiation of dialysis during this admission BUN/Cr 74/4.94 on admission, GFR 10. Cr 5.1 n September per discharge paperwork w/ pt  - BUN/Cr 76/5.72 on 10/24  - Renal consulted, appreciate recs  - Renal US revealing hyperechoic kidneys bilaterally, consistent with medical renal disease, left ureteral jet was not visualized, however no hydronephrosis or hydroureter.  - C/w home Calcium Acetate 667mg TID and Sodium Bicarb 650mg TID  - per Renal, patient will not need initiation of dialysis during this admission, will defer to pt's out-pt nephrologist Dr. Musa Lara ([facility])

## 2020-10-24 NOTE — PROGRESS NOTE ADULT - ATTENDING COMMENTS
See PA note written above, for details. I reviewed the PA documentation.  I have personally seen and examined this patient today. I reviewed vitals, labs, medications, cardiac studies and additional imaging.  I agree with the PA's findings and plans as written above with the following additions/amendments:  Patient currently euvolemic and compensated on exam.   Plan for:  Transition to Torsemide 40 daily  Carvedilol augment to 25 BID  Entresto/ACE/ARB deferred given CKD V not on HD  Daily STANDING weights, core measure orders  PT evaluation, patient recommend for home PT  Nephrology following for CKD V  -->rec continue with Lokelma 10g q24h PRN, continue with sodium bicarb 650mg TID   Anticipate discharge to home [place] 10/25 as patient able to get transportation home tomorrow  Future planning: patient to have appt made with Cardiologist within 1 week of discharge for quality improvement CHF readmission risk reduction  MARILEE Wadsworth.  Cardiology Attending

## 2020-10-24 NOTE — PROGRESS NOTE ADULT - SUBJECTIVE AND OBJECTIVE BOX
S: Pt seen and examined bedside.  Patient denies C/P, SOB, N/V, dizziness, palpitations, and diaphoresis.  Pt denies fever/chills, dysuria, abdominal pain, diarrhea, and cough  12 Point ROS otherwise negative except as per HPI/subjective.     O: Vital Signs Last 24 Hrs  T(C): 36.3 (24 Oct 2020 13:58), Max: 37 (23 Oct 2020 14:18)  T(F): 97.4 (24 Oct 2020 13:58), Max: 98.6 (23 Oct 2020 14:18)  HR: 78 (24 Oct 2020 13:30) (73 - 90)  BP: 152/64 (24 Oct 2020 13:30) (123/73 - 152/64)  BP(mean): 100 (24 Oct 2020 13:30) (88 - 100)  RR: 16 (24 Oct 2020 13:30) (16 - 18)  SpO2: 98% (24 Oct 2020 13:30) (97% - 99%)    PHYSICAL EXAM:  GEN: NAD  HEENT: No JVD  PULM:  CTA B/L  CARD:  RRR, S1 and S2   ABD: +BS, NT, soft/ND	  EXT: No Edema B/L LE  NEURO: A+Ox3, no focal deficit  PSYCH: Mood Appropriate    LABS:                        9.0    8.03  )-----------( 308      ( 24 Oct 2020 05:52 )             27.6     10-    136  |  104  |  76<H>  ----------------------------<  96  5.2   |  17<L>  |  5.72<H>    Ca    9.3      24 Oct 2020 05:52  Mg     1.9     10-      PTT - ( 23 Oct 2020 07:02 )  PTT:59.0 sec  Troponin T, Serum: 0.41 ng/mL (10-23-20 @ 07:02)  Troponin T, Serum: 0.37 ng/mL (10-23-20 @ 00:27)  Troponin T, Serum: 0.39 ng/mL (10-22-20 @ 18:58)  Troponin T, Serum: 0.36 ng/mL (10-22-20 @ 12:40)  Troponin T, Serum: 0.28 ng/mL (10-22-20 @ 05:44)  Troponin T, Serum: 0.25 ng/mL (10-21-20 @ 23:03)  Troponin T, Serum: 0.25 ng/mL (10-21-20 @ 19:09)  Troponin T, Serum: 0.10 ng/mL (10-21-20 @ 14:44)      10-23 @ 07:01  -  10-24 @ 07:00  --------------------------------------------------------  IN: 757.5 mL / OUT: 1500 mL / NET: -742.5 mL    10-24 @ 07:01  -  10-24 @ 14:01  --------------------------------------------------------  IN: 420 mL / OUT: 500 mL / NET: -80 mL      Daily     Daily Weight in k.9 (24 Oct 2020 07:04)   S: Pt seen and examined bedside. Pt reports she is frustrated with multiple recent hospital admissions, she is still having intermittent mild substernal chest pain.   Patient denies C/P, SOB, N/V, dizziness, palpitations, and diaphoresis.  Pt denies fever/chills, dysuria, abdominal pain, diarrhea, and cough  12 Point ROS otherwise negative except as per HPI/subjective.     O: Vital Signs Last 24 Hrs  T(C): 36.3 (24 Oct 2020 13:58), Max: 37 (23 Oct 2020 14:18)  T(F): 97.4 (24 Oct 2020 13:58), Max: 98.6 (23 Oct 2020 14:18)  HR: 78 (24 Oct 2020 13:30) (73 - 90)  BP: 152/64 (24 Oct 2020 13:30) (123/73 - 152/64)  BP(mean): 100 (24 Oct 2020 13:30) (88 - 100)  RR: 16 (24 Oct 2020 13:30) (16 - 18)  SpO2: 98% (24 Oct 2020 13:30) (97% - 99%)    PHYSICAL EXAM:  GEN: NAD  HEENT: No JVD  PULM:  CTA B/L  CARD:  RRR, S1 and S2   ABD: +BS, NT, soft/ND	  EXT: No Edema B/L LE  NEURO: A+Ox3, no focal deficit  PSYCH: Mood Appropriate    LABS:                        9.0    8.03  )-----------( 308      ( 24 Oct 2020 05:52 )             27.6     10-    136  |  104  |  76<H>  ----------------------------<  96  5.2   |  17<L>  |  5.72<H>    Ca    9.3      24 Oct 2020 05:52  Mg     1.9     10-      PTT - ( 23 Oct 2020 07:02 )  PTT:59.0 sec  Troponin T, Serum: 0.41 ng/mL (10-23-20 @ 07:02)  Troponin T, Serum: 0.37 ng/mL (10-23-20 @ 00:27)  Troponin T, Serum: 0.39 ng/mL (10-22-20 @ 18:58)  Troponin T, Serum: 0.36 ng/mL (10-22-20 @ 12:40)  Troponin T, Serum: 0.28 ng/mL (10-22-20 @ 05:44)  Troponin T, Serum: 0.25 ng/mL (10-21-20 @ 23:03)  Troponin T, Serum: 0.25 ng/mL (10-21-20 @ 19:09)  Troponin T, Serum: 0.10 ng/mL (10-21-20 @ 14:44)      10-23 @ 07:01  -  10-24 @ 07:00  --------------------------------------------------------  IN: 757.5 mL / OUT: 1500 mL / NET: -742.5 mL    10-24 @ 07:01  -  10-24 @ 14:01  --------------------------------------------------------  IN: 420 mL / OUT: 500 mL / NET: -80 mL      Daily     Daily Weight in k.9 (24 Oct 2020 07:04)   S: Pt seen and examined bedside. Pt reports she is frustrated with multiple recent hospital admissions, she is still having intermittent mild substernal chest pain.   Patient denies C/P, SOB, N/V, dizziness, palpitations, and diaphoresis.  Pt denies fever/chills, dysuria, abdominal pain, diarrhea, and cough  12 Point ROS otherwise negative except as per HPI/subjective.     O: Vital Signs Last 24 Hrs  T(C): 36.3 (24 Oct 2020 13:58), Max: 37 (23 Oct 2020 14:18)  T(F): 97.4 (24 Oct 2020 13:58), Max: 98.6 (23 Oct 2020 14:18)  HR: 78 (24 Oct 2020 13:30) (73 - 90)  BP: 152/64 (24 Oct 2020 13:30) (123/73 - 152/64)  BP(mean): 100 (24 Oct 2020 13:30) (88 - 100)  RR: 16 (24 Oct 2020 13:30) (16 - 18)  SpO2: 98% (24 Oct 2020 13:30) (97% - 99%)    PHYSICAL EXAM:  GEN: NAD  HEENT: No JVD  PULM:  CTA B/L, no WRR  CARD:  RRR, S1 and S2, no murmur  ABD: +BS, NT, soft/ND	  EXT: No Edema B/L LE, B/L calf tenderness, distal pulses faint   NEURO: A+Ox3, no focal deficit  PSYCH: Mood Appropriate    LABS:                        9.0    8.03  )-----------( 308      ( 24 Oct 2020 05:52 )             27.6     10-24    136  |  104  |  76<H>  ----------------------------<  96  5.2   |  17<L>  |  5.72<H>    Ca    9.3      24 Oct 2020 05:52  Mg     1.9     10-24      PTT - ( 23 Oct 2020 07:02 )  PTT:59.0 sec  Troponin T, Serum: 0.41 ng/mL (10-23-20 @ 07:02)  Troponin T, Serum: 0.37 ng/mL (10-23-20 @ 00:27)  Troponin T, Serum: 0.39 ng/mL (10-22-20 @ 18:58)  Troponin T, Serum: 0.36 ng/mL (10-22-20 @ 12:40)  Troponin T, Serum: 0.28 ng/mL (10-22-20 @ 05:44)  Troponin T, Serum: 0.25 ng/mL (10-21-20 @ 23:03)  Troponin T, Serum: 0.25 ng/mL (10-21-20 @ 19:09)  Troponin T, Serum: 0.10 ng/mL (10-21-20 @ 14:44)      10-23 @ 07:01  -  10-24 @ 07:00  --------------------------------------------------------  IN: 757.5 mL / OUT: 1500 mL / NET: -742.5 mL    10-24 @ 07:01  -  10-24 @ 14:01  --------------------------------------------------------  IN: 420 mL / OUT: 500 mL / NET: -80 mL      Daily     Daily Weight in k.9 (24 Oct 2020 07:04)

## 2020-10-24 NOTE — PROGRESS NOTE ADULT - PROBLEM SELECTOR PLAN 2
Currently CP free and HD stable  - Pt given ASA 162mg PO x 1 by EMS and 162mg PO x 1 by ED attending. Also loaded w/ Brilinta 180mg PO x 1 in ED and started on Heparin gtt. Per Dr. Silva, d/c Heparin gtt and Brilinta at this time given suspicion that Troponin leak likely 2/2 demand ischemia upon admission   - Trop 0.1 -> 0.25 -> 0.25 -> 0.28 -> 0.36 -> 0.39 -> 0.37 -> 0.41  - no longer trending as per Dr. Silva   - restarted heparin gtt around 8 PM 10/21, continue to achieve 48 hours of therapy, to complete 10/23 evening   - EKG Sinus Tach @ 128BPM, LVH w/ strain pattern upon admission   - repeat EKG unchanged from admission   - Brilinta transitioned to Plavix on 10/22  - continue ASA 81 mg daily and Plavix 75 mg daily   - deemed patient would not undergo cardiac cath, continue with medical management Currently c/o intermittent mild substernal chest pain, HD stable  - Pt given ASA 162mg PO x 1 by EMS and 162mg PO x 1 by ED attending. Also loaded w/ Brilinta 180mg PO x 1 in ED and started on Heparin gtt. Per Dr. Silva, d/c Heparin gtt and Brilinta at this time given suspicion that Troponin leak likely 2/2 demand ischemia upon admission   - Trop peaked at 0.41, no longer trending  - EKG Sinus Tach @ 128BPM, LVH w/ strain pattern upon admission   - repeat EKG unchanged from admission   - Brilinta transitioned to Plavix on 10/22; continue ASA 81 mg daily and Plavix 75 mg daily   - deemed patient would not undergo cardiac cath, continue with medical management

## 2020-10-24 NOTE — PROGRESS NOTE ADULT - PROBLEM SELECTOR PLAN 3
SBPs currently rangin 120's to 160's   - Ordered for Bumex 2 mg IV TID, Coreg 12.5 mg BID, and Nifedipine 30 mg BID  - transitioning to Torsemide 40 mg daily on 10/24   - continue to monitor SBPs currently ranging 120's to 130's   - Continue Coreg 12.5 mg BID, and Nifedipine 30 mg BID  - Bumex 2mg IV TID transitioned to Torsemide 40 mg daily on 10/24   - continue to monitor SBPs currently ranging 120's to 130's   - Home Coreg 12.5 mg BID was increased to 25mg PO BID, continue Nifedipine 30 mg BID  - Bumex 2mg IV TID transitioned to Torsemide 40 mg daily on 10/24   - continue to monitor SBPs currently ranging 120's to 130's   - Coreg 12.5 mg BID was increased to 25mg PO BID, continue Nifedipine 30 mg BID  (on Labetalol 200mg BID and Nifedipine 90mg QD at home)   - Bumex 2mg IV TID transitioned to Torsemide 40 mg daily on 10/24   - continue to monitor

## 2020-10-24 NOTE — PROGRESS NOTE ADULT - PROBLEM SELECTOR PLAN 8
Hgb 9.2 on admission, likely 2/2 AOCD from CKD Stage V  - irons studies normal   - Hgb 9.4 on 10/22   - Maintain active T/S  - continue to monitor CBC Hgb 9.2 on admission, likely 2/2 AOCD from CKD Stage V  - irons studies normal   - Hgb 9.0 on 10/24   - Maintain active T/S  - continue to monitor CBC

## 2020-10-24 NOTE — PROGRESS NOTE ADULT - ASSESSMENT
56 y/o Female, former smoker, CONTRAST INTOLERANCE (vomiting), with strong FamHx of CHF (mother and maternal aunt,  from CHF, age ~70s), and PMHx of Diastolic CHF (EF 55-60% by Echo on 10/21/20), HTN, HLD, IDDM, CKD Stage V, AOCD, multiple extended admission in 2020 for fluid overload per pt, and ?PAD (pt reports 60% blockage in left leg, but denies peripheral cath) who presented to [facility] ED on 10/21/20 c/o substernal chest pressure, radiating to left arm, 10/10 in severity, w/ associated SOB, dizziness, diaphoresis, and dry cough. Pt is now admitted to [facility] for further management of acute on chronic diastolic CHF, HTN urgency, and r/o ACS. Troponins have progressed from 0.1 -> 0.25 -> 0.25 -> 0.28 -> 0.36 -> 0.39 -> 0.37 -> 0.41, no longer trending. Patient was reinitiated on a heparin gtt around 8PM on 10/21. After extensive conversation with Nephrology team, Dr. Tinajero, and Dr. Silva, it was determined that patient would not go for cardiac catheterization and would be medically managed for her NSTEMI. Per Nephrology team, patient will not require initiation of dialysis during this admission. Patient's Brilinta was transitioned back to Plavix, heparin gtt will be continued to achieve 48 hours of therapy, to be completed on 10/23 evening. Patient is being diuresed with Bumex 2 mg TID with a plan to transition to Torsemide 40 mg daily on 10/24/2020. Patient's BP better controlled with current Coreg and Nifedipine. 56 y/o Female, former smoker, CONTRAST INTOLERANCE (vomiting), with strong FamHx of CHF (mother and maternal aunt,  from CHF, age ~70s), and PMHx of Diastolic CHF (EF 55-60% by Echo on 10/21/20), HTN, HLD, IDDM, CKD Stage V, AOCD, multiple extended admission in 2020 for fluid overload per pt, and ?PAD (pt reports 60% blockage in left leg, but denies peripheral cath) who presented to [facility] ED on 10/21/20 c/o substernal chest pressure, radiating to left arm, 10/10 in severity, w/ associated SOB, dizziness, diaphoresis, and dry cough. Pt r/i NSTEMI with peaked troponin 0.41, plan to medically manage NSTEMI at this time. Nephrology following for management of CKD, pt will not require initiation of dialysis during this admission, will defer to pt's out-pt nephrologist. Pt is transitioned to Torsemide 40 mg daily for diuresis.  Pt is now admitted to [facility] for further management of acute on chronic diastolic CHF, HTN urgency, and management of NSTEMI.

## 2020-10-24 NOTE — PROGRESS NOTE ADULT - PROBLEM SELECTOR PLAN 4
RESOLVED; WBC 18.46 on admission, no left shift. Currently afebrile, but endorses dysuria  - CXR right basilar opacity and b/l pleural effusions  - UA appears contaminated on admission, repeat showed present bacteria but negative nitrites and leukocytes   - patient states urinary symptoms are intermittent   - WBC improved to 9.04 on 10/24, patient remains afebrile   - No ABx at this time per Dr. Silva RESOLVED; WBC 18.46 on admission, no left shift. Currently afebrile, but endorses dysuria  - CXR right basilar opacity and b/l pleural effusions  - UA appears contaminated on admission, repeat showed present bacteria but negative nitrites and leukocytes   - patient states urinary symptoms are intermittent   - WBC improved to 8.03 on 10/24, patient remains afebrile   - No ABx at this time

## 2020-10-24 NOTE — PROGRESS NOTE ADULT - PROBLEM SELECTOR PLAN 1
+JVD, bibasilar crackles, satting well on 2L, no tachypnea or respiratory distress on admission  - CXR reveals b/l pleural effusions, right basilar opacity on admission, repeat CXR 10/22 improved   - Echo revealed EF 55-60%, grade III diastolic dysfunction, moderate MR, moderately dilated LA, RV free wall mildly hypertrophied. Per Echo read findings are c/w infiltrative dz like amyloid, but Dr Silva personally reviewed Echo and does not consider findings c/w amyloid  - continuing Bumex 2 mg TID, transition to Torsemide 40 mg daily on 10/24  - continuing Coreg 12.5 mg BID and Nifedipine 30mg BID (on Labetalol 200mg BID and Nifedipine 90mg QD at home)  - C/w home Symbicort and Xopenex PRN for SOB/wheezing  - Core measures, strict I/Os, daily weights, 1L fluid restriction +JVD, bibasilar crackles, satting well on 2L, no tachypnea or respiratory distress on admission  - CXR reveals b/l pleural effusions, right basilar opacity on admission, repeat CXR 10/22 improved   - Echo revealed EF 55-60%, grade III diastolic dysfunction, moderate MR, moderately dilated LA, RV free wall mildly hypertrophied. Per Echo read findings are c/w infiltrative dz like amyloid, but Dr Silva personally reviewed Echo and does not consider findings c/w amyloid  - continuing Bumex 2 mg TID, transition to Torsemide 40 mg daily on 10/24  - Home Coreg 12.5 mg BID increased to 25mg PP BID, continue Nifedipine 30mg BID (on Labetalol 200mg BID and Nifedipine 90mg QD at home)  - C/w home Symbicort and Xopenex PRN for SOB/wheezing  - Core measures, strict I/Os, daily weights, 1L fluid restriction +JVD, bibasilar crackles, satting well on 2L, no tachypnea or respiratory distress on admission  - CXR reveals b/l pleural effusions, right basilar opacity on admission, repeat CXR 10/22 improved   - Echo revealed EF 55-60%, grade III diastolic dysfunction, moderate MR, moderately dilated LA, RV free wall mildly hypertrophied. Per Echo read findings are c/w infiltrative dz like amyloid, but Dr Silva personally reviewed Echo and does not consider findings c/w amyloid  - continuing Bumex 2 mg TID, transition to Torsemide 40 mg daily on 10/24  - Coreg 12.5 mg BID increased to 25mg PO BID, continue Nifedipine 30mg BID (on Labetalol 200mg BID and Nifedipine 90mg QD at home)  - C/w home Symbicort and Xopenex PRN for SOB/wheezing  - Core measures, strict I/Os, daily weights, 1L fluid restriction

## 2020-10-25 LAB
ANION GAP SERPL CALC-SCNC: 16 MMOL/L — SIGNIFICANT CHANGE UP (ref 5–17)
BUN SERPL-MCNC: 86 MG/DL — HIGH (ref 7–23)
CALCIUM SERPL-MCNC: 9.1 MG/DL — SIGNIFICANT CHANGE UP (ref 8.4–10.5)
CHLORIDE SERPL-SCNC: 105 MMOL/L — SIGNIFICANT CHANGE UP (ref 96–108)
CO2 SERPL-SCNC: 17 MMOL/L — LOW (ref 22–31)
CREAT SERPL-MCNC: 6.49 MG/DL — HIGH (ref 0.5–1.3)
GLUCOSE BLDC GLUCOMTR-MCNC: 116 MG/DL — HIGH (ref 70–99)
GLUCOSE BLDC GLUCOMTR-MCNC: 127 MG/DL — HIGH (ref 70–99)
GLUCOSE BLDC GLUCOMTR-MCNC: 170 MG/DL — HIGH (ref 70–99)
GLUCOSE BLDC GLUCOMTR-MCNC: 193 MG/DL — HIGH (ref 70–99)
GLUCOSE SERPL-MCNC: 79 MG/DL — SIGNIFICANT CHANGE UP (ref 70–99)
HCT VFR BLD CALC: 25.8 % — LOW (ref 34.5–45)
HGB BLD-MCNC: 8.5 G/DL — LOW (ref 11.5–15.5)
MAGNESIUM SERPL-MCNC: 1.8 MG/DL — SIGNIFICANT CHANGE UP (ref 1.6–2.6)
MCHC RBC-ENTMCNC: 28.6 PG — SIGNIFICANT CHANGE UP (ref 27–34)
MCHC RBC-ENTMCNC: 32.9 GM/DL — SIGNIFICANT CHANGE UP (ref 32–36)
MCV RBC AUTO: 86.9 FL — SIGNIFICANT CHANGE UP (ref 80–100)
NRBC # BLD: 0 /100 WBCS — SIGNIFICANT CHANGE UP (ref 0–0)
PLATELET # BLD AUTO: 281 K/UL — SIGNIFICANT CHANGE UP (ref 150–400)
POTASSIUM SERPL-MCNC: 5.3 MMOL/L — SIGNIFICANT CHANGE UP (ref 3.5–5.3)
POTASSIUM SERPL-SCNC: 5.3 MMOL/L — SIGNIFICANT CHANGE UP (ref 3.5–5.3)
RBC # BLD: 2.97 M/UL — LOW (ref 3.8–5.2)
RBC # FLD: 16.2 % — HIGH (ref 10.3–14.5)
SODIUM SERPL-SCNC: 138 MMOL/L — SIGNIFICANT CHANGE UP (ref 135–145)
WBC # BLD: 9.02 K/UL — SIGNIFICANT CHANGE UP (ref 3.8–10.5)
WBC # FLD AUTO: 9.02 K/UL — SIGNIFICANT CHANGE UP (ref 3.8–10.5)

## 2020-10-25 PROCEDURE — 99233 SBSQ HOSP IP/OBS HIGH 50: CPT

## 2020-10-25 PROCEDURE — 99233 SBSQ HOSP IP/OBS HIGH 50: CPT | Mod: GC

## 2020-10-25 RX ORDER — MAGNESIUM OXIDE 400 MG ORAL TABLET 241.3 MG
800 TABLET ORAL ONCE
Refills: 0 | Status: DISCONTINUED | OUTPATIENT
Start: 2020-10-25 | End: 2020-10-25

## 2020-10-25 RX ADMIN — Medication 30 MILLIGRAM(S): at 18:14

## 2020-10-25 RX ADMIN — Medication 4 UNIT(S): at 12:46

## 2020-10-25 RX ADMIN — SENNA PLUS 2 TABLET(S): 8.6 TABLET ORAL at 11:21

## 2020-10-25 RX ADMIN — Medication 667 MILLIGRAM(S): at 18:55

## 2020-10-25 RX ADMIN — Medication 0.25 MILLIGRAM(S): at 22:03

## 2020-10-25 RX ADMIN — HEPARIN SODIUM 5000 UNIT(S): 5000 INJECTION INTRAVENOUS; SUBCUTANEOUS at 11:22

## 2020-10-25 RX ADMIN — BUDESONIDE AND FORMOTEROL FUMARATE DIHYDRATE 2 PUFF(S): 160; 4.5 AEROSOL RESPIRATORY (INHALATION) at 18:16

## 2020-10-25 RX ADMIN — HEPARIN SODIUM 5000 UNIT(S): 5000 INJECTION INTRAVENOUS; SUBCUTANEOUS at 18:45

## 2020-10-25 RX ADMIN — Medication 1: at 07:29

## 2020-10-25 RX ADMIN — Medication 650 MILLIGRAM(S): at 14:10

## 2020-10-25 RX ADMIN — Medication 81 MILLIGRAM(S): at 11:22

## 2020-10-25 RX ADMIN — Medication 667 MILLIGRAM(S): at 07:56

## 2020-10-25 RX ADMIN — Medication 4 UNIT(S): at 07:56

## 2020-10-25 RX ADMIN — Medication 667 MILLIGRAM(S): at 11:23

## 2020-10-25 RX ADMIN — Medication 1: at 22:03

## 2020-10-25 RX ADMIN — Medication 650 MILLIGRAM(S): at 22:03

## 2020-10-25 RX ADMIN — Medication 0.25 MILLIGRAM(S): at 00:21

## 2020-10-25 RX ADMIN — Medication 40 MILLIGRAM(S): at 06:33

## 2020-10-25 RX ADMIN — Medication 30 MILLIGRAM(S): at 06:31

## 2020-10-25 RX ADMIN — Medication 5 MILLIGRAM(S): at 22:03

## 2020-10-25 RX ADMIN — Medication 4 UNIT(S): at 18:54

## 2020-10-25 RX ADMIN — CARVEDILOL PHOSPHATE 25 MILLIGRAM(S): 80 CAPSULE, EXTENDED RELEASE ORAL at 18:14

## 2020-10-25 RX ADMIN — CLOPIDOGREL BISULFATE 75 MILLIGRAM(S): 75 TABLET, FILM COATED ORAL at 11:21

## 2020-10-25 RX ADMIN — Medication 650 MILLIGRAM(S): at 12:53

## 2020-10-25 RX ADMIN — Medication 650 MILLIGRAM(S): at 13:51

## 2020-10-25 RX ADMIN — BUDESONIDE AND FORMOTEROL FUMARATE DIHYDRATE 2 PUFF(S): 160; 4.5 AEROSOL RESPIRATORY (INHALATION) at 06:32

## 2020-10-25 RX ADMIN — HEPARIN SODIUM 5000 UNIT(S): 5000 INJECTION INTRAVENOUS; SUBCUTANEOUS at 02:03

## 2020-10-25 RX ADMIN — CARVEDILOL PHOSPHATE 25 MILLIGRAM(S): 80 CAPSULE, EXTENDED RELEASE ORAL at 06:32

## 2020-10-25 RX ADMIN — INSULIN GLARGINE 12 UNIT(S): 100 INJECTION, SOLUTION SUBCUTANEOUS at 22:03

## 2020-10-25 RX ADMIN — ATORVASTATIN CALCIUM 80 MILLIGRAM(S): 80 TABLET, FILM COATED ORAL at 22:03

## 2020-10-25 RX ADMIN — Medication 650 MILLIGRAM(S): at 06:32

## 2020-10-25 NOTE — PROGRESS NOTE ADULT - PROBLEM SELECTOR PLAN 3
SBPs currently ranging 110's to 130's   - continue Coreg 25 mg BID and Nifedipine 30 mg BID  (on Labetalol 200mg BID and Nifedipine 90mg QD at home)   - s/p IV diuresis w/ Bumex, continue Torsemide 40 mg daily  - continue to monitor

## 2020-10-25 NOTE — PROGRESS NOTE ADULT - ATTENDING COMMENTS
See PA note written above, for details. I reviewed the PA documentation.  I have personally seen and examined this patient today. I reviewed vitals, labs, medications, cardiac studies and additional imaging.  I agree with the PA's findings and plans as written above with the following additions/amendments:  Patient currently euvolemic and compensated on exam.   Plan for:  Torsemide 40 daily will dose after receipt of creatinine on 10.26  -->discussed with nephro possible trial off diuretics to see if Cr improves, however pt will need some maintenance diuretics if not initiating HD  Carvedilol 25 BID  Entresto/ACE/ARB deferred given CKD V not on HD  Daily STANDING weights, core measure orders  PT evaluation, patient recommend for home PT  Nephrology following for CKD V  -->rec continue with Lokelma 10g q24h PRN, continue with sodium bicarb 650mg TID   Discharge pending definitive nephrology plan re: diuretic holiday vs maintenance diuretics vs HD  Future planning: patient to have appt made with Cardiologist within 1 week of discharge for quality improvement CHF readmission risk reduction  MARILEE Wadsworth.  Cardiology Attending

## 2020-10-25 NOTE — PROGRESS NOTE ADULT - ASSESSMENT
54 y/o female, former smoker, CONTRAST INTOLERANCE (vomiting), w/ strong FamHx of CHF (mother and maternal aunt,  from CHF, age ~70s), and PMHx of Diastolic CHF (EF 55-60% by Echo on 10/21/20), HTN, HLD, IDDM, CKD Stage V, AOCD, ?PAD (pt reports 60% blockage in left leg, but denies peripheral cath), and multiple extended admission in 2020 for fluid overload per pt who presented to [facility] ED on 10/21/2020 c/o substernal chest pressure, radiating to left arm, 10/10 in severity, w/ associated SOB, dizziness, diaphoresis, and dry cough. Patient r/i NSTEMI with peaked troponin 0.41, and NSTEMI was medically manage w/ 48 hours Heparin gtt and DAPT (ASA/Plavix). Patient is s/p IV diuresis w/ Bumex and now transitioned to Torsemide 40 mg daily. Nephrology following for management of CKD, and previously stated patient would not require initiation of dialysis during this admission. Patient's Cr continues to uptrend (Cr 6.49 on 10/25), and Nephrology was contacted to ensure patient would be seen on 10/25/2020 for possible initiation of dialysis while still admitted and prior to being discharged.

## 2020-10-25 NOTE — PROGRESS NOTE ADULT - ASSESSMENT
55F CKD V (venous mapping completed 2 weeks ago), DM, CHF, DM, emphysema p/w shortness of breath. Nephrology consulted for MARIUM.     Assessment/Plan:   #HTN urgency   #hyperkalemia  #MARIUM on CKD, possible acute hypoxic respiratory failure, possible acute CHF exacerbation   unclear baseline creatinine  possibly MARIUM on CKD vs baseline CKD vs cardiorenal syndrome     Recommend:   continue with torsemide 40mg q24h   continue with Lokelma 10g q24h PRN   continue with sodium bicarb 650mg TID   consider urine lytes (Na, K, Cl, Cr, Phos) to r/o RTA given persistently low bicarb, may defer to out-patient work-up   resume home anti-HTN PRN   renal sono non-contributory   strict I/Os   renal diet   no absolute acute indication for hemodialysis   given recurrent hospitalizations, explained risk of re-hospitalization and importance of compliance w/fluid restriction to 1L/day at home as well as daily diuretic; patient demonstrates understanding and return though she admitted that she was previously not careful about fluid restriction and does have anxiety attacks which may be contributing to her presentations   patient refusing initiation of hemodialysis in-patient at this time   reasonable to defer hemodialysis at this time to avoid unnecessary catheter and associated risk of infection  patient had venous mapping ~2 weeks ago and has a follow-up appointment with her primary nephrologist for this week   preferable to avoid hemodialysis initiation unless clinically indicated     Thank you for the opportunity to participate in the care of your patient. The nephrology service remains available to assist with any questions or concerns. Please feel free to reach us by paging the on-call nephrology fellow for urgent issues or as below.     Isaias Saba M.D.   PGY-4, Nephrology Fellow   C: [phone]   P: [phone]

## 2020-10-25 NOTE — PROGRESS NOTE ADULT - PROBLEM SELECTOR PLAN 1
Currently euvolemic on exam   - CXR on admission revealed b/l pleural effusions, right basilar opacity, now improved   - Echo revealed EF 55-60%, grade III diastolic dysfunction, moderate MR, moderately dilated LA, RV free wall mildly hypertrophied; per Echo read findings are c/w infiltrative dz like amyloid, but Dr Silva personally reviewed Echo and does not consider findings c/w amyloid  - s/p IV diuresis w/ Bumex, now on Torsemide 40 mg daily   - continue Coreg 25 mg BID and Nifedipine 30mg BID (on Labetalol 200mg BID and Nifedipine 90mg QD at home)  - continue home Symbicort and Xopenex PRN for SOB/wheezing  - core measures, strict I/Os, daily weights, 1L fluid restriction

## 2020-10-25 NOTE — PROGRESS NOTE ADULT - ATTENDING COMMENTS
I independently performed the key portions of the evaluation and management service provided. I agree with the above history, physical, and plan which I have reviewed and edited where appropriate. I find worsening renal failure, hyperkalemia.  Discussed in detail wit Dr. Beatriz Dill. Diuretics are required. Multiple recent admissions for CHF. Given eGFR <= 10 and recent instability, starting dialysis may be safest option. See full note. (Patient seen earlier in day.)

## 2020-10-25 NOTE — PROGRESS NOTE ADULT - SUBJECTIVE AND OBJECTIVE BOX
Patient is a 55y Female seen and evaluated at bedside.   no longer c/o leg pain or CP   feels well  wants to go home   no SOB   BP acceptable   UOP adequate       Meds:    acetaminophen   Tablet .. 650 every 6 hours PRN  ALPRAZolam 0.25 every 8 hours PRN  aluminum hydroxide/magnesium hydroxide/simethicone Suspension 30 every 6 hours PRN  aspirin enteric coated 81 daily  atorvastatin 80 at bedtime  budesonide  80 MICROgram(s)/formoterol 4.5 MICROgram(s) Inhaler 2 two times a day  calcium acetate 667 three times a day with meals  carvedilol 25 every 12 hours  clopidogrel Tablet 75 daily  dextrose 40% Gel 15 once PRN  dextrose 40% Gel 15 once PRN  dextrose 5%. 1000 <Continuous>  dextrose 50% Injectable 12.5 once  dextrose 50% Injectable 25 once  dextrose 50% Injectable 25 once  glucagon  Injectable 1 once PRN  heparin   Injectable 5000 every 8 hours  insulin glargine Injectable (LANTUS) 12 at bedtime  insulin lispro (ADMELOG) corrective regimen sliding scale  Before meals and at bedtime  insulin lispro Injectable (ADMELOG) 4 three times a day before meals  levalbuterol Inhalation 0.63 every 6 hours PRN  melatonin 5 at bedtime  NIFEdipine XL 30 two times a day  senna 2 daily  sodium bicarbonate 650 three times a day      T(C): , Max: 36.9 (10-24-20 @ 18:13)  T(F): , Max: 98.4 (10-24-20 @ 18:13)  HR: 78 (10-25-20 @ 11:19)  BP: 130/58 (10-25-20 @ 11:19)  BP(mean): 85 (10-25-20 @ 05:18)  RR: 17 (10-25-20 @ 11:19)  SpO2: 96% (10-25-20 @ 11:19)  Wt(kg): --    10-24 @ 07:01  -  10-25 @ 07:00  --------------------------------------------------------  IN: 1020 mL / OUT: 2050 mL / NET: -1030 mL    10-25 @ 07:01  -  10-25 @ 14:07  --------------------------------------------------------  IN: 240 mL / OUT: 300 mL / NET: -60 mL          Review of Systems:  CONSTITUTIONAL: No fever or anorexia.  RESPIRATORY: no shortness of breath  CARDIOVASCULAR: no chest pain  GASTROINTESTINAL: No abdominal pain, nausea, vomiting, diarrhea  MUSCULOSKELETAL: No swelling       PHYSICAL EXAM:  GENERAL: Alert, awake, oriented x3 on RA breathing comfortably   CHEST/LUNG: Bilateral clear breath sounds  HEART: Regular rate and rhythm, no murmur, no gallops, no rub   ABDOMEN: Soft, nontender, non distended  EXTREMITIES: no pedal edema  NEURO: No asterixis       LABS:                        8.5    9.02  )-----------( 281      ( 25 Oct 2020 05:46 )             25.8     10-25    138  |  105  |  86<H>  ----------------------------<  79  5.3   |  17<L>  |  6.49<H>    Ca    9.1      25 Oct 2020 05:46  Mg     1.8     10-25                  RADIOLOGY & ADDITIONAL STUDIES:

## 2020-10-25 NOTE — PROGRESS NOTE ADULT - PROBLEM SELECTOR PLAN 9
Pt takes Lantus 16u QHS and Humalog 12u BID at home per pt  - Ordered for Lantus 12u QHS, Lispro 4u TID, FS/ISS  - A1c 6.1

## 2020-10-25 NOTE — PROGRESS NOTE ADULT - SUBJECTIVE AND OBJECTIVE BOX
Interventional Cardiology PA Adult Progress Note    C.C.: Chest pain/SOB     Subjective Assessment: Patient seen and examined at bedside this morning. Patient states that she feels a little overwhelmed, but denies any active complaints upon exam. Patient appears in good spirits.     ROS Negative except as per Subjective and HPI  	  MEDICATIONS:  carvedilol 25 milliGRAM(s) Oral every 12 hours  NIFEdipine XL 30 milliGRAM(s) Oral two times a day  torsemide 40 milliGRAM(s) Oral daily  budesonide  80 MICROgram(s)/formoterol 4.5 MICROgram(s) Inhaler 2 Puff(s) Inhalation two times a day  levalbuterol Inhalation 0.63 milliGRAM(s) Inhalation every 6 hours PRN  acetaminophen   Tablet .. 650 milliGRAM(s) Oral every 6 hours PRN  ALPRAZolam 0.25 milliGRAM(s) Oral every 8 hours PRN  melatonin 5 milliGRAM(s) Oral at bedtime  aluminum hydroxide/magnesium hydroxide/simethicone Suspension 30 milliLiter(s) Oral every 6 hours PRN  senna 2 Tablet(s) Oral daily  atorvastatin 80 milliGRAM(s) Oral at bedtime  dextrose 40% Gel 15 Gram(s) Oral once PRN  dextrose 40% Gel 15 Gram(s) Oral once PRN  dextrose 50% Injectable 12.5 Gram(s) IV Push once  dextrose 50% Injectable 25 Gram(s) IV Push once  dextrose 50% Injectable 25 Gram(s) IV Push once  glucagon  Injectable 1 milliGRAM(s) IntraMuscular once PRN  insulin glargine Injectable (LANTUS) 12 Unit(s) SubCutaneous at bedtime  insulin lispro (ADMELOG) corrective regimen sliding scale   SubCutaneous Before meals and at bedtime  insulin lispro Injectable (ADMELOG) 4 Unit(s) SubCutaneous three times a day before meals  aspirin enteric coated 81 milliGRAM(s) Oral daily  calcium acetate 667 milliGRAM(s) Oral three times a day with meals  clopidogrel Tablet 75 milliGRAM(s) Oral daily  dextrose 5%. 1000 milliLiter(s) IV Continuous <Continuous>  heparin   Injectable 5000 Unit(s) SubCutaneous every 8 hours  sodium bicarbonate 650 milliGRAM(s) Oral three times a day      PHYSICAL EXAM:  TELEMETRY: No overnight events.   T(C): 36.7 (10-25-20 @ 10:13), Max: 36.9 (10-24-20 @ 18:13)  HR: 78 (10-25-20 @ 11:19) (75 - 86)  BP: 130/58 (10-25-20 @ 11:19) (115/62 - 152/64)  RR: 17 (10-25-20 @ 11:19) (16 - 18)  SpO2: 96% (10-25-20 @ 11:19) (96% - 98%)  Wt(kg): --  I&O's Summary    24 Oct 2020 07:01  -  25 Oct 2020 07:00  --------------------------------------------------------  IN: 1020 mL / OUT: 2050 mL / NET: -1030 mL    25 Oct 2020 07:01  -  25 Oct 2020 13:00  --------------------------------------------------------  IN: 240 mL / OUT: 300 mL / NET: -60 mL                                         Appearance: NAD	  HEENT: Normal oral mucosa, PERRL, EOMI	  Neck: Supple, - JVD   Cardiovascular: Normal S1/S2, No JVD, No murmurs   Respiratory: Lungs clear to auscultation, No Rales, Rhonchi, Wheezing	  Gastrointestinal:  Soft, Non-tender	  Skin: No rashes, No ecchymoses, No cyanosis  Extremities: Normal range of motion, No clubbing, cyanosis or edema  Vascular: Peripheral pulses palpable 2+ bilaterally  Neurologic: Non-focal  Psychiatry: A & O x 3, Mood & affect appropriate    	    ECG:  	  RADIOLOGY:   DIAGNOSTIC TESTING:  [X] Echocardiogram (10/21/2020): EF 55-60%, grade III diastolic dysfunction, moderate MR, moderately dilated LA, RV free wall mildly hypertrophied   [ ] Catheterization:  [ ] Stress Test:    [ ] YANICK  	    LABS:	                         8.5    9.02  )-----------( 281      ( 25 Oct 2020 05:46 )             25.8     10-25    138  |  105  |  86<H>  ----------------------------<  79  5.3   |  17<L>  |  6.49<H>    Ca    9.1      25 Oct 2020 05:46  Mg     1.8     10-25

## 2020-10-25 NOTE — PROGRESS NOTE ADULT - PROBLEM SELECTOR PLAN 10
Cholesterol: 298, Triglycerides: 178, HDL: 50, LDL: 212  - C/w home Atorvastatin 80mg QHS    VTE: Heparin SQ  Dispo: pending renal recs     Case d/w Dr Dill

## 2020-10-25 NOTE — PROGRESS NOTE ADULT - PROBLEM SELECTOR PLAN 5
BUN/Cr 74/4.94 on admission, GFR 10. Cr 5.1 in September per discharge paperwork w/ pt  - BUN/Cr 86/6.49 on 10/25  - Renal US revealing hyperechoic kidneys bilaterally, consistent with medical renal disease, left ureteral jet was not visualized, however no hydronephrosis or hydroureter  - C/w home Calcium Acetate 667mg TID and Sodium Bicarb 650mg TID  - Renal consulted, previously stated patient would not need initiation of dialysis during admission, however continuing to follow and was contacted to ensure patient would be seen on 10/25 due to continued increase in BUN/Cr   - patients outpatient nephrologist: Dr. Musa Lara at [place]

## 2020-10-25 NOTE — PROGRESS NOTE ADULT - PROBLEM SELECTOR PLAN 8
Hgb 9.2 on admission, likely 2/2 AOCD from CKD Stage V  - irons studies normal   - Hgb 8.5 on 10/25   - Maintain active T/S  - continue to monitor CBC

## 2020-10-25 NOTE — PROGRESS NOTE ADULT - PROBLEM SELECTOR PROBLEM 5
CKD (chronic kidney disease) stage 5, GFR less than 15 ml/min

## 2020-10-25 NOTE — PROGRESS NOTE ADULT - PROBLEM SELECTOR PLAN 7
D-Dimer 713 on admission, currently satting well on RA, non-tachypneic, borderline tachycardic. B/L calves TTP  - LE duplex 10/23 negative for DVT  - No CTA Chest at this time given CKD stage V. Consider VQ scan if suspicious for PE

## 2020-10-25 NOTE — PROGRESS NOTE ADULT - PROBLEM SELECTOR PROBLEM 8
Anemia of chronic disease

## 2020-10-25 NOTE — PROGRESS NOTE ADULT - PROBLEM SELECTOR PLAN 6
RESOLVED; K 5.9 on admission, s/p Calcium Gluconate 1g IV x 1 and Lokelma 10g PO x 1 in ED  - K 5.3 on 10/25  - continue to monitor daily BMP's

## 2020-10-26 ENCOUNTER — TRANSCRIPTION ENCOUNTER (OUTPATIENT)
Age: 55
End: 2020-10-26

## 2020-10-26 VITALS — TEMPERATURE: 98 F

## 2020-10-26 PROBLEM — N28.9 DISORDER OF KIDNEY AND URETER, UNSPECIFIED: Chronic | Status: ACTIVE | Noted: 2020-10-21

## 2020-10-26 PROBLEM — J40 BRONCHITIS, NOT SPECIFIED AS ACUTE OR CHRONIC: Chronic | Status: ACTIVE | Noted: 2020-10-21

## 2020-10-26 PROBLEM — Z00.00 ENCOUNTER FOR PREVENTIVE HEALTH EXAMINATION: Status: ACTIVE | Noted: 2020-10-26

## 2020-10-26 PROBLEM — I10 ESSENTIAL (PRIMARY) HYPERTENSION: Chronic | Status: ACTIVE | Noted: 2020-10-21

## 2020-10-26 PROBLEM — D63.8 ANEMIA IN OTHER CHRONIC DISEASES CLASSIFIED ELSEWHERE: Chronic | Status: ACTIVE | Noted: 2020-10-21

## 2020-10-26 PROBLEM — I50.30 UNSPECIFIED DIASTOLIC (CONGESTIVE) HEART FAILURE: Chronic | Status: ACTIVE | Noted: 2020-10-21

## 2020-10-26 PROBLEM — E11.9 TYPE 2 DIABETES MELLITUS WITHOUT COMPLICATIONS: Chronic | Status: ACTIVE | Noted: 2020-10-21

## 2020-10-26 PROBLEM — E78.5 HYPERLIPIDEMIA, UNSPECIFIED: Chronic | Status: ACTIVE | Noted: 2020-10-21

## 2020-10-26 PROBLEM — N18.5 CHRONIC KIDNEY DISEASE, STAGE 5: Chronic | Status: ACTIVE | Noted: 2020-10-21

## 2020-10-26 LAB
ANION GAP SERPL CALC-SCNC: 14 MMOL/L — SIGNIFICANT CHANGE UP (ref 5–17)
BUN SERPL-MCNC: 95 MG/DL — HIGH (ref 7–23)
CALCIUM SERPL-MCNC: 9.6 MG/DL — SIGNIFICANT CHANGE UP (ref 8.4–10.5)
CHLORIDE SERPL-SCNC: 102 MMOL/L — SIGNIFICANT CHANGE UP (ref 96–108)
CO2 SERPL-SCNC: 21 MMOL/L — LOW (ref 22–31)
CREAT SERPL-MCNC: 6.15 MG/DL — HIGH (ref 0.5–1.3)
GLUCOSE BLDC GLUCOMTR-MCNC: 115 MG/DL — HIGH (ref 70–99)
GLUCOSE BLDC GLUCOMTR-MCNC: 94 MG/DL — SIGNIFICANT CHANGE UP (ref 70–99)
GLUCOSE SERPL-MCNC: 106 MG/DL — HIGH (ref 70–99)
HCT VFR BLD CALC: 27.7 % — LOW (ref 34.5–45)
HGB BLD-MCNC: 8.9 G/DL — LOW (ref 11.5–15.5)
MAGNESIUM SERPL-MCNC: 2 MG/DL — SIGNIFICANT CHANGE UP (ref 1.6–2.6)
MCHC RBC-ENTMCNC: 28.3 PG — SIGNIFICANT CHANGE UP (ref 27–34)
MCHC RBC-ENTMCNC: 32.1 GM/DL — SIGNIFICANT CHANGE UP (ref 32–36)
MCV RBC AUTO: 87.9 FL — SIGNIFICANT CHANGE UP (ref 80–100)
NRBC # BLD: 0 /100 WBCS — SIGNIFICANT CHANGE UP (ref 0–0)
PLATELET # BLD AUTO: 308 K/UL — SIGNIFICANT CHANGE UP (ref 150–400)
POTASSIUM SERPL-MCNC: 5.8 MMOL/L — HIGH (ref 3.5–5.3)
POTASSIUM SERPL-SCNC: 5.8 MMOL/L — HIGH (ref 3.5–5.3)
RBC # BLD: 3.15 M/UL — LOW (ref 3.8–5.2)
RBC # FLD: 16.7 % — HIGH (ref 10.3–14.5)
SODIUM SERPL-SCNC: 137 MMOL/L — SIGNIFICANT CHANGE UP (ref 135–145)
WBC # BLD: 8.39 K/UL — SIGNIFICANT CHANGE UP (ref 3.8–10.5)
WBC # FLD AUTO: 8.39 K/UL — SIGNIFICANT CHANGE UP (ref 3.8–10.5)

## 2020-10-26 PROCEDURE — 81001 URINALYSIS AUTO W/SCOPE: CPT

## 2020-10-26 PROCEDURE — 99291 CRITICAL CARE FIRST HOUR: CPT | Mod: 25

## 2020-10-26 PROCEDURE — 84145 PROCALCITONIN (PCT): CPT

## 2020-10-26 PROCEDURE — 97162 PT EVAL MOD COMPLEX 30 MIN: CPT

## 2020-10-26 PROCEDURE — 86705 HEP B CORE ANTIBODY IGM: CPT

## 2020-10-26 PROCEDURE — 87340 HEPATITIS B SURFACE AG IA: CPT

## 2020-10-26 PROCEDURE — 80053 COMPREHEN METABOLIC PANEL: CPT

## 2020-10-26 PROCEDURE — 83735 ASSAY OF MAGNESIUM: CPT

## 2020-10-26 PROCEDURE — 84443 ASSAY THYROID STIM HORMONE: CPT

## 2020-10-26 PROCEDURE — 85379 FIBRIN DEGRADATION QUANT: CPT

## 2020-10-26 PROCEDURE — 82306 VITAMIN D 25 HYDROXY: CPT

## 2020-10-26 PROCEDURE — 83970 ASSAY OF PARATHORMONE: CPT

## 2020-10-26 PROCEDURE — 83036 HEMOGLOBIN GLYCOSYLATED A1C: CPT

## 2020-10-26 PROCEDURE — 83540 ASSAY OF IRON: CPT

## 2020-10-26 PROCEDURE — 85025 COMPLETE CBC W/AUTO DIFF WBC: CPT

## 2020-10-26 PROCEDURE — 83550 IRON BINDING TEST: CPT

## 2020-10-26 PROCEDURE — 82570 ASSAY OF URINE CREATININE: CPT

## 2020-10-26 PROCEDURE — 84484 ASSAY OF TROPONIN QUANT: CPT

## 2020-10-26 PROCEDURE — 76770 US EXAM ABDO BACK WALL COMP: CPT

## 2020-10-26 PROCEDURE — 86850 RBC ANTIBODY SCREEN: CPT

## 2020-10-26 PROCEDURE — 86900 BLOOD TYPING SEROLOGIC ABO: CPT

## 2020-10-26 PROCEDURE — 80061 LIPID PANEL: CPT

## 2020-10-26 PROCEDURE — 86709 HEPATITIS A IGM ANTIBODY: CPT

## 2020-10-26 PROCEDURE — 93970 EXTREMITY STUDY: CPT

## 2020-10-26 PROCEDURE — 86706 HEP B SURFACE ANTIBODY: CPT

## 2020-10-26 PROCEDURE — 85730 THROMBOPLASTIN TIME PARTIAL: CPT

## 2020-10-26 PROCEDURE — 96374 THER/PROPH/DIAG INJ IV PUSH: CPT

## 2020-10-26 PROCEDURE — 87635 SARS-COV-2 COVID-19 AMP PRB: CPT

## 2020-10-26 PROCEDURE — 87086 URINE CULTURE/COLONY COUNT: CPT

## 2020-10-26 PROCEDURE — 85027 COMPLETE CBC AUTOMATED: CPT

## 2020-10-26 PROCEDURE — 83935 ASSAY OF URINE OSMOLALITY: CPT

## 2020-10-26 PROCEDURE — 94640 AIRWAY INHALATION TREATMENT: CPT

## 2020-10-26 PROCEDURE — 83880 ASSAY OF NATRIURETIC PEPTIDE: CPT

## 2020-10-26 PROCEDURE — 82962 GLUCOSE BLOOD TEST: CPT

## 2020-10-26 PROCEDURE — 84100 ASSAY OF PHOSPHORUS: CPT

## 2020-10-26 PROCEDURE — 99239 HOSP IP/OBS DSCHRG MGMT >30: CPT

## 2020-10-26 PROCEDURE — 80048 BASIC METABOLIC PNL TOTAL CA: CPT

## 2020-10-26 PROCEDURE — 86480 TB TEST CELL IMMUN MEASURE: CPT

## 2020-10-26 PROCEDURE — 82310 ASSAY OF CALCIUM: CPT

## 2020-10-26 PROCEDURE — 85610 PROTHROMBIN TIME: CPT

## 2020-10-26 PROCEDURE — 93321 DOPPLER ECHO F-UP/LMTD STD: CPT

## 2020-10-26 PROCEDURE — 82728 ASSAY OF FERRITIN: CPT

## 2020-10-26 PROCEDURE — 84300 ASSAY OF URINE SODIUM: CPT

## 2020-10-26 PROCEDURE — 86901 BLOOD TYPING SEROLOGIC RH(D): CPT

## 2020-10-26 PROCEDURE — 84156 ASSAY OF PROTEIN URINE: CPT

## 2020-10-26 PROCEDURE — 84466 ASSAY OF TRANSFERRIN: CPT

## 2020-10-26 PROCEDURE — 86704 HEP B CORE ANTIBODY TOTAL: CPT

## 2020-10-26 PROCEDURE — 36415 COLL VENOUS BLD VENIPUNCTURE: CPT

## 2020-10-26 PROCEDURE — 93005 ELECTROCARDIOGRAM TRACING: CPT

## 2020-10-26 PROCEDURE — 97116 GAIT TRAINING THERAPY: CPT

## 2020-10-26 PROCEDURE — 82550 ASSAY OF CK (CPK): CPT

## 2020-10-26 PROCEDURE — 82652 VIT D 1 25-DIHYDROXY: CPT

## 2020-10-26 PROCEDURE — 86803 HEPATITIS C AB TEST: CPT

## 2020-10-26 PROCEDURE — 82553 CREATINE MB FRACTION: CPT

## 2020-10-26 PROCEDURE — 71045 X-RAY EXAM CHEST 1 VIEW: CPT

## 2020-10-26 RX ORDER — SODIUM ZIRCONIUM CYCLOSILICATE 10 G/10G
10 POWDER, FOR SUSPENSION ORAL
Qty: 300 | Refills: 0
Start: 2020-10-26 | End: 2020-11-24

## 2020-10-26 RX ORDER — CARVEDILOL PHOSPHATE 80 MG/1
1 CAPSULE, EXTENDED RELEASE ORAL
Qty: 60 | Refills: 0
Start: 2020-10-26 | End: 2020-11-24

## 2020-10-26 RX ORDER — ASPIRIN/CALCIUM CARB/MAGNESIUM 324 MG
1 TABLET ORAL
Qty: 30 | Refills: 0
Start: 2020-10-26 | End: 2020-11-24

## 2020-10-26 RX ORDER — ASPIRIN/CALCIUM CARB/MAGNESIUM 324 MG
1 TABLET ORAL
Qty: 30 | Refills: 10
Start: 2020-10-26 | End: 2021-09-20

## 2020-10-26 RX ORDER — CARVEDILOL PHOSPHATE 80 MG/1
1 CAPSULE, EXTENDED RELEASE ORAL
Qty: 60 | Refills: 1
Start: 2020-10-26 | End: 2020-12-24

## 2020-10-26 RX ORDER — FUROSEMIDE 40 MG
1 TABLET ORAL
Qty: 0 | Refills: 0 | DISCHARGE

## 2020-10-26 RX ORDER — NIFEDIPINE 30 MG
1 TABLET, EXTENDED RELEASE 24 HR ORAL
Qty: 60 | Refills: 2
Start: 2020-10-26 | End: 2021-01-23

## 2020-10-26 RX ORDER — LABETALOL HCL 100 MG
1 TABLET ORAL
Qty: 0 | Refills: 0 | DISCHARGE

## 2020-10-26 RX ORDER — SODIUM POLYSTYRENE SULFONATE 4.1 MEQ/G
60 POWDER, FOR SUSPENSION ORAL
Qty: 0 | Refills: 0 | DISCHARGE

## 2020-10-26 RX ORDER — CLOPIDOGREL BISULFATE 75 MG/1
1 TABLET, FILM COATED ORAL
Qty: 30 | Refills: 0
Start: 2020-10-26 | End: 2020-11-24

## 2020-10-26 RX ORDER — NIFEDIPINE 30 MG
1 TABLET, EXTENDED RELEASE 24 HR ORAL
Qty: 60 | Refills: 0
Start: 2020-10-26 | End: 2020-11-24

## 2020-10-26 RX ORDER — CLOPIDOGREL BISULFATE 75 MG/1
1 TABLET, FILM COATED ORAL
Qty: 30 | Refills: 10
Start: 2020-10-26 | End: 2021-09-20

## 2020-10-26 RX ORDER — NIFEDIPINE 30 MG
1 TABLET, EXTENDED RELEASE 24 HR ORAL
Qty: 0 | Refills: 0 | DISCHARGE

## 2020-10-26 RX ADMIN — HEPARIN SODIUM 5000 UNIT(S): 5000 INJECTION INTRAVENOUS; SUBCUTANEOUS at 11:32

## 2020-10-26 RX ADMIN — CARVEDILOL PHOSPHATE 25 MILLIGRAM(S): 80 CAPSULE, EXTENDED RELEASE ORAL at 06:45

## 2020-10-26 RX ADMIN — Medication 650 MILLIGRAM(S): at 13:27

## 2020-10-26 RX ADMIN — Medication 4 UNIT(S): at 12:30

## 2020-10-26 RX ADMIN — SENNA PLUS 2 TABLET(S): 8.6 TABLET ORAL at 11:32

## 2020-10-26 RX ADMIN — Medication 81 MILLIGRAM(S): at 11:32

## 2020-10-26 RX ADMIN — BUDESONIDE AND FORMOTEROL FUMARATE DIHYDRATE 2 PUFF(S): 160; 4.5 AEROSOL RESPIRATORY (INHALATION) at 06:44

## 2020-10-26 RX ADMIN — Medication 667 MILLIGRAM(S): at 11:32

## 2020-10-26 RX ADMIN — Medication 650 MILLIGRAM(S): at 06:44

## 2020-10-26 RX ADMIN — HEPARIN SODIUM 5000 UNIT(S): 5000 INJECTION INTRAVENOUS; SUBCUTANEOUS at 01:48

## 2020-10-26 RX ADMIN — Medication 667 MILLIGRAM(S): at 08:37

## 2020-10-26 RX ADMIN — Medication 4 UNIT(S): at 08:38

## 2020-10-26 RX ADMIN — Medication 30 MILLIGRAM(S): at 06:44

## 2020-10-26 RX ADMIN — CLOPIDOGREL BISULFATE 75 MILLIGRAM(S): 75 TABLET, FILM COATED ORAL at 11:32

## 2020-10-26 RX ADMIN — Medication 40 MILLIGRAM(S): at 11:32

## 2020-10-26 NOTE — PROGRESS NOTE ADULT - ASSESSMENT
55F CKD V (venous mapping completed 2 weeks ago), DM, CHF, DM, emphysema p/w shortness of breath. Nephrology consulted for MARIUM.     Assessment/Plan:   #HTN urgency   #hyperkalemia  #MARIUM on CKD, possible acute hypoxic respiratory failure, possible acute CHF exacerbation   unclear baseline creatinine  possibly MARIUM on CKD vs baseline CKD vs cardiorenal syndrome     Recommend:   continue with torsemide 40mg q24h, may increase to BID on discharge to ensure compliance w/once daily dosing   continue with Lokelma 10g q24h, continue on discharge   continue with sodium bicarb 650mg TID, continue on discharge    consider urine lytes (Na, K, Cl, Cr, Phos) to r/o RTA given persistently low bicarb, may defer to out-patient work-up   resume home anti-HTN PRN, continue on discharge   renal sono non-contributory   strict I/Os   renal diet   no absolute acute indication for hemodialysis   given recurrent hospitalizations, explained risk of re-hospitalization and importance of compliance w/fluid restriction to 1L/day at home as well as BID diuretic, and importance of taking daily Lokelma; patient demonstrates understanding and return though she admitted that she was previously not careful about fluid restriction and does have anxiety attacks which may be contributing to her presentations   patient refusing initiation of hemodialysis in-patient at this time   reasonable to defer hemodialysis at this time to avoid unnecessary catheter and associated risk of infection  patient had venous mapping ~2 weeks ago and has a follow-up appointment with her primary nephrologist (Dr. Ramires, @[facility]) for this Thursday; discussed w/him by phone and he prefers to avoid hemodialysis initiation and will see her in his office Thursday and will repeat labs   preferable to avoid hemodialysis initiation unless clinically indicated     Thank you for the opportunity to participate in the care of your patient. The nephrology service remains available to assist with any questions or concerns. Please feel free to reach us by paging the on-call nephrology fellow for urgent issues or as below.     Isaias Saba M.D.   PGY-4, Nephrology Fellow   C: [phone]   P: [phone]

## 2020-10-26 NOTE — DISCHARGE NOTE PROVIDER - CARE PROVIDER_API CALL
Clemencia Saini G  CARDIOLOGY  [address] [place] [address]  Phone: [phone]  Fax: [phone]  Scheduled Appointment: 11/04/2020 10:00 AM

## 2020-10-26 NOTE — DISCHARGE NOTE PROVIDER - HOSPITAL COURSE
56 y/o female, former smoker, CONTRAST INTOLERANCE (vomiting), w/ strong FHx CHF (mother and maternal aunt,  from CHF, age ~70s), and PMHx of Diastolic CHF (EF 55-60% by Echo on 10/21/2020), HTN, HLD, IDDM, CKD Stage V, AOCD, multiple extended admission in 2020 for fluid overload (as per patient), and ?PAD (patient reports 60% blockage in left leg, but denies peripheral cath) who presented to [facility] ED on 10/21/2020 c/o substernal chest pressure, radiating to left arm, 10/10 in severity, w/ associated SOB, dizziness, diaphoresis, and dry cough which started while making tea on the morning of presentation. Patient endorsed 6 pillow orthopnea and SOB with ambulation of 1 block at baseline, but denied previous chest pain during those episodes. Patient stated she had a normal stress test about 1 year ago, but does not recall where it was performed. Additionally, patient admitted to dysuria and cloudy appearance of urine. Patient denied headache, syncope, fevers, chills, palpitations, nausea/vomiting/diarrhea, melena, BRBPR, worsening LE edema, recent travel, sick contacts, or any other symptoms at this time. During transport by EMS, patien was given ASA 162mg PO x 1 and Nitro spray w/ mild relief of her symptoms. On arrival to ED, VS showed temp 97.6F,  bpm, /90 mmHg, RR 22, and SpO2 100% on NRB mask. Patient subsequently weaned down to 4L nasal cannula in the ED and continued satting well. Labs on admission significant for WBC 18.46 (no left shift), Hgb 9.2, BUN/Cr 74/4.94, K 5.9, Trop 0.1, and BNP 20,046. EKG revealed sinus tachycardia at 128BPM, LVH w/ strain pattern. CXR in ED revealed cardiomegaly, right basilar opacity, and b/l pleural effusions. Echocardiogram was performed and revealed EF 55-60%, grade III diastolic dysfunction w/ elevated filling pressures, moderate symmetric LVH, restrictive filling pattern with a short deceleration time of E wave, RV free wall mildly hypertrophied, moderately dilated LA, moderate MR, trace MN/TR, small pericardial effusion. In ED, patient was given SLG NTG x 1, ASA 162mg PO x 1, Brilinta 180mg PO x 1, started Heparin gtt, Lokelma 10g PO x 1, and Calcium Gluconate 1g IV x 1. Patient was admitted to cardiology/telemetry for further management of acute on chronic diastolic CHF, HTN urgency, and r/o ACS and Nephrology was consulted on patient's care for worsening kidney function.     Heparin gtt that had been initiated in the ED was discontinued as per Dr. Silva's instructions due to likelihood that troponin leak was in the setting of demand ischemia; however, second troponin further increased to 0.25, NSTEMI was fuled in, and heparin gtt was restarted on 10/21/2020 evening. Cardiac enzymes were further trended and troponin trend was 0.1 -> 0.25 -> 0.25 -> 0.28 -> 0.36 -> 0.39 -> 0.37 -> 0.41. On 10/21, an extensive discussion was had w/ Dr. Silva, Dr. Tinajero, and the Nephrology team about whether the patient should undergo cardiac catheterization w/ possible intervention due to concern that the patient would need dialysis to be initiated following the catheterization procedure. It was eventually decided to medically managed the patient's NSTEMI and patient was continued on heparin gtt until 10/23 evening (completed 48 hours of therapy). Patient was also initially continued on Aspirin 81 mg daily and Brilinta 90 mg daily, but Brilinta was transitioned to Plavix on 10/22 w/ Plavix 60 mg loading dose given and Plavix 75 mg daily starting 10/23. Patient was initially diuresed w/ Bumex 2 mg BID which was increased to TID on 10/22. Patient was adequately diuresed and transitioned to Torsemide 40 mg daily on 10/24. Patient's BP medications were continuously adjusted and patient's blood pressure was controlled with a regimen of Coreg 25 mg BID and Nifedipine 30 mg BID.     Patient's kidney function was monitored throughout admission and nephrology continued to follow for possible need for dialysis initiation during admission. Nephrology team continue to state there was no acute indication to initiate dialysis during the admission. However, on 10/25/2020 patient's BUN/Cr noted to be 86/6.49 and nephrology stated that there was still no acute indication to emergently initiate hemodialysis but did admit that it would possibly be the safest option for the patient. On 10/26/2020, patients Cr improved to 6.15 but BUN had worsened to 95. Nephrology evaluated the patient and on 10/26/2020 Dr. Ximena Chisholm stated she would like to initiate dialysis; however, patients outpatient nephrologist, Dr. Musa Guzmán at [place], was contacted to discuss the plan for dialysis with him and he stated he would prefer dialysis not to be initiated while she is admitted and further stated he would see the patient in his outpatient office on Thursday for repeated labs and further management of her worsening chronic kidney disease. The plan was discussed with Nephrology team and Dr. Avril Dill (cardiology attending) and it was decided that the patient would be discharged on Torsemide 40 mg BID and Lokelma 10 mg daily w/ a plan to follow up w/ her outpatient nephrologist closely on Thursday, 10/29/2020. This plan was further discussed w/ both the patient and the patient's daughter, and both expressed an understanding of the plan.     No significant events on telemetry overnight. Patient has been medically cleared for discharge as per Dr. Dill. Patient has been given appropriate discharge instructions including medication regimen and follow up. Medications that patient needs have been e-prescribed to [facility] and refills have been sent to her preferred pharmacy on file.      VS Stable   Gen: NAD, A&O x3  Cards: RRR, clear S1 and S2 without murmur  Pulm: CTA B/L without w/r/r  Vascular: peripheral pulses 2+ B/L  Abd: soft, NT  Ext: no LE edema or ulcerations B/L 54 y/o female, former smoker, CONTRAST INTOLERANCE (vomiting), w/ strong FHx CHF (mother and maternal aunt,  from CHF, age ~70s), and PMHx of Diastolic CHF (EF 55-60% by Echo on 10/21/2020), HTN, HLD, IDDM, CKD Stage V, AOCD, multiple extended admission in 2020 for fluid overload (as per patient), and ?PAD (patient reports 60% blockage in left leg, but denies peripheral cath) who presented to [facility] ED on 10/21/2020 c/o substernal chest pressure, radiating to left arm, 10/10 in severity, w/ associated SOB, dizziness, diaphoresis, and dry cough which started while making tea on the morning of presentation. Patient endorsed 6 pillow orthopnea and SOB with ambulation of 1 block at baseline, but denied previous chest pain during those episodes. Patient stated she had a normal stress test about 1 year ago, but does not recall where it was performed. Additionally, patient admitted to dysuria and cloudy appearance of urine. Patient denied headache, syncope, fevers, chills, palpitations, nausea/vomiting/diarrhea, melena, BRBPR, worsening LE edema, recent travel, sick contacts, or any other symptoms at this time. During transport by EMS, patien was given ASA 162mg PO x 1 and Nitro spray w/ mild relief of her symptoms. On arrival to ED, VS showed temp 97.6F,  bpm, /90 mmHg, RR 22, and SpO2 100% on NRB mask. Patient subsequently weaned down to 4L nasal cannula in the ED and continued satting well. Labs on admission significant for WBC 18.46 (no left shift), Hgb 9.2, BUN/Cr 74/4.94, K 5.9, Trop 0.1, and BNP 20,046. EKG revealed sinus tachycardia at 128BPM, LVH w/ strain pattern. CXR in ED revealed cardiomegaly, right basilar opacity, and b/l pleural effusions. Echocardiogram was performed and revealed EF 55-60%, grade III diastolic dysfunction w/ elevated filling pressures, moderate symmetric LVH, restrictive filling pattern with a short deceleration time of E wave, RV free wall mildly hypertrophied, moderately dilated LA, moderate MR, trace ID/TR, small pericardial effusion. In ED, patient was given SLG NTG x 1, ASA 162mg PO x 1, Brilinta 180mg PO x 1, started Heparin gtt, Lokelma 10g PO x 1, and Calcium Gluconate 1g IV x 1. Patient was admitted to cardiology/telemetry for further management of acute on chronic diastolic CHF, HTN urgency, and r/o ACS and Nephrology was consulted on patient's care for worsening kidney function.     Heparin gtt that had been initiated in the ED was discontinued as per Dr. Silva's instructions due to likelihood that troponin leak was in the setting of demand ischemia; however, second troponin further increased to 0.25, NSTEMI was fuled in, and heparin gtt was restarted on 10/21/2020 evening. Cardiac enzymes were further trended and troponin trend was 0.1 -> 0.25 -> 0.25 -> 0.28 -> 0.36 -> 0.39 -> 0.37 -> 0.41. On 10/21, an extensive discussion was had w/ Dr. Silva, Dr. Tinajero, and the Nephrology team about whether the patient should undergo cardiac catheterization w/ possible intervention due to concern that the patient would need dialysis to be initiated following the catheterization procedure. It was eventually decided to medically managed the patient's NSTEMI and patient was continued on heparin gtt until 10/23 evening (completed 48 hours of therapy). Patient was also initially continued on Aspirin 81 mg daily and Brilinta 90 mg BID, but Brilinta was transitioned to Plavix on 10/22 w/ Plavix 600 mg loading dose given and Plavix 75 mg daily starting 10/23. Patient was initially diuresed w/ Bumex 2 mg BID which was increased to TID on 10/22. Patient was adequately diuresed and transitioned to Torsemide 40 mg daily on 10/24. Patient's BP medications were continuously adjusted and patient's blood pressure was controlled with a regimen of Coreg 25 mg BID and Nifedipine 30 mg BID.     Patient's kidney function was monitored throughout admission and nephrology continued to follow for possible need for dialysis initiation during admission. Nephrology team continue to state there was no acute indication to initiate dialysis during the admission. However, on 10/25/2020 patient's BUN/Cr noted to be 86/6.49 and nephrology stated that there was still no acute indication to emergently initiate hemodialysis but did admit that it would possibly be the safest option for the patient. On 10/26/2020, patients Cr improved to 6.15 but BUN had worsened to 95. Nephrology evaluated the patient and on 10/26/2020 Dr. Ximena Chisholm stated she would like to initiate dialysis; however, patients outpatient nephrologist, Dr. Musa Guzmán at [place], was contacted to discuss the plan for dialysis with him and he stated he would prefer dialysis not to be initiated while she is admitted and further stated he would see the patient in his outpatient office on Thursday for repeated labs and further management of her worsening chronic kidney disease. The plan was discussed with Nephrology team and Dr. Avril Dill (cardiology attending) and it was decided that the patient would be discharged on Torsemide 40 mg BID and Lokelma 10 mg daily w/ a plan to follow up w/ her outpatient nephrologist closely on Thursday, 10/29/2020. This plan was further discussed w/ both the patient and the patient's daughter, and both expressed an understanding of the plan.     No significant events on telemetry overnight. Patient has been medically cleared for discharge as per Dr. Dill. Patient has been given appropriate discharge instructions including medication regimen and follow up. Medications that patient needs have been e-prescribed to [facility] and refills have been sent to her preferred pharmacy on file.      VS Stable   Gen: NAD, A&O x3  Cards: RRR, clear S1 and S2 without murmur  Pulm: CTA B/L without w/r/r  Vascular: peripheral pulses 2+ B/L  Abd: soft, NT  Ext: no LE edema or ulcerations B/L

## 2020-10-26 NOTE — DISCHARGE NOTE PROVIDER - NSDCCPCAREPLAN_GEN_ALL_CORE_FT
PRINCIPAL DISCHARGE DIAGNOSIS  Diagnosis: Non-ST elevation MI (NSTEMI)  Assessment and Plan of Treatment: You presented to the hospital with a complaint of chest pain and were found to have elevated cardiac enzymes, and thus you ruled in with a NSTEMI. It was decided to medically manage this diagnosis and you were treated with 48 hours of a heparin drip and started on DAPT with Aspirin 81 mg daily and Plavix 75 mg daily. You are now being discharged on these medications and should continue them as they are prescribed to you. These medications should help to ensure that you do not develop any further blockages in your heart arteries. DO NOT STOP THESE MEDICATIONS FOR ANY REASON UNLESS OTHERWISE INDICATED BY YOUR CARDIOLOGIST BECAUSE THIS WILL PUT YOU AT RISK FOR A HEART ATTACK. All of your prescriptions have been sent electronically to your pharmacy. You should also follow up with Dr. Saini, and an appointment has been made for you. The appointment information is included in your discharge paperwork.      SECONDARY DISCHARGE DIAGNOSES  Diagnosis: Chronic kidney disease, stage V  Assessment and Plan of Treatment: You have a history of being diagnosed with chronic kidney disease, and during your admission your kidney function continued to worsen. The Nephrology team continued to follow your care and there was an extensive discussion on whether to initiate dialysis during your admission. Upon conversation with the primary team, Nephrology team, and your outpatient nephrologist it was determined    Diagnosis: Hypertension  Assessment and Plan of Treatment:     Diagnosis: Hyperlipidemia  Assessment and Plan of Treatment:     Diagnosis: Insulin dependent type 2 diabetes mellitus  Assessment and Plan of Treatment:      PRINCIPAL DISCHARGE DIAGNOSIS  Diagnosis: Non-ST elevation MI (NSTEMI)  Assessment and Plan of Treatment: You presented to the hospital with a complaint of chest pain and were found to have elevated cardiac enzymes, and thus you ruled in with a NSTEMI. It was decided to medically manage this diagnosis and you were treated with 48 hours of a heparin drip and started on DAPT with Aspirin 81 mg daily and Plavix 75 mg daily. You are now being discharged on these medications and should continue them as they are prescribed to you. These medications should help to ensure that you do not develop any further blockages in your heart arteries. DO NOT STOP THESE MEDICATIONS FOR ANY REASON UNLESS OTHERWISE INDICATED BY YOUR CARDIOLOGIST BECAUSE THIS WILL PUT YOU AT RISK FOR A HEART ATTACK. All of your prescriptions have been sent electronically to your pharmacy. You should also follow up with Dr. Saini, and an appointment has been made for you. The appointment information is included in your discharge paperwork.      SECONDARY DISCHARGE DIAGNOSES  Diagnosis: Chronic kidney disease, stage V  Assessment and Plan of Treatment: You have a history of being diagnosed with chronic kidney disease, and during your admission your kidney function continued to worsen. The Nephrology team continued to follow your care and there was an extensive discussion on whether to initiate dialysis during your admission. Upon conversation with the primary team, Nephrology team, and your outpatient nephrologist it was determined NOT to initiate dialysis while you were admitted. You are now being discharged and should continue a regimen of Torsemide 40 mg BID, Lokelma 10 g daily, Calcium Acetate 667 TID, and Sodium Bicarbonate 650 mg 2 tablets TID to ensure that fluid and toxins do not build up in your body. Please also ensure that you attend your follow up appointment with your outpatient nephrologist, Dr. Musa Guzmán, that is scheduled for Thursday 10/29/2020. It is imperative that you continue with close outpatient follow up for this issue.    Diagnosis: Hypertension  Assessment and Plan of Treatment: You have a diagnosis of high blood pressure and we had adjusted these medications. You should now continue a medication regimen of Coreg 25 mg BID, Nifedipine 30 mg BID, and Torsemide 40 mg BID. These medications will help to control your blood pressure.    Diagnosis: Hyperlipidemia  Assessment and Plan of Treatment: You have a history of being diagnosed with high cholesterol. You should continue to take your home medication of Atorvastatin 80 mg daily and continue to follow up with your outpatient providers for further management.    Diagnosis: Insulin dependent type 2 diabetes mellitus  Assessment and Plan of Treatment: You have a history of being diagnosed with diabetes mellitus and should continue your insulin regimen as you were previously taking at home.

## 2020-10-26 NOTE — DISCHARGE NOTE PROVIDER - NSDCFUADDAPPT_GEN_ALL_CORE_FT
An appointment has been made for you with Dr. Clemencia Saini, an outpatient cardiologist, for 11/04/2020 at 10 AM. You should ensure you attend this appointment for further management of your health.   Address: [address] [place] [address]   Phone #: [phone]      An appointment was confirmed with your outpatient nephrologist, Dr. Chelsea Lara, for 10/29/2020 and it is crucial that you attend this appointment.

## 2020-10-26 NOTE — DISCHARGE NOTE PROVIDER - NSDCMRMEDTOKEN_GEN_ALL_CORE_FT
aspirin 81 mg oral delayed release tablet: 1 tab(s) orally once a day  atorvastatin 80 mg oral tablet: 1 tab(s) orally once a day  calcium acetate 667 mg oral tablet: 1 tab(s) orally 3 times a day  carvedilol 25 mg oral tablet: 1 tab(s) orally every 12 hours  clopidogrel 75 mg oral tablet: 1 tab(s) orally once a day  HumaLOG 100 units/mL injectable solution: 12 unit(s) injectable 2 times a day  Lokelma 10 g oral powder for reconstitution: 10 gram(s) orally once a day   Melatonin 5 mg oral tablet: 1 tab(s) orally once a day (at bedtime)  NIFEdipine 30 mg oral tablet, extended release: 1 tab(s) orally 2 times a day  sodium bicarbonate 650 mg oral tablet: 2 tab(s) orally 3 times a day  Symbicort 80 mcg-4.5 mcg/inh inhalation aerosol: 2 puff(s) inhaled 2 times a day  torsemide 20 mg oral tablet: 2 tab(s) orally 2 times a day  Toujeo SoloStar 300 units/mL subcutaneous solution: 16 unit(s) subcutaneous once a day (at bedtime)

## 2020-10-26 NOTE — DISCHARGE NOTE PROVIDER - INSTRUCTIONS
Please ensure that you adhere to a renally restricted diet due to your history of kidney disease. This includes making sure you restrict your intake of foods high in sodium, phosphorus (typically high protein foods), protein, and potassium (such as spinach, broccoli, and potatoes). Please also ensure that you are restricting your fluid intake to 1 liter of fluid per day, and this includes water, coffee, tea, juice, soup, and any other forms of liquid.

## 2020-10-26 NOTE — DISCHARGE NOTE NURSING/CASE MANAGEMENT/SOCIAL WORK - NSDCFUADDAPPT_GEN_ALL_CORE_FT
An appointment has been made for you with Dr. Clemencia Saini, an outpatient cardiologist, for 11/04/2020 at 10 AM. You should ensure you attend this appointment for further management of your health.   Address: [address] [place] [address]   Phone #: [phone]      An appointment was confirmed with your outpatient nephrologist, Dr. Chelsea Lara, for 10/29/2020 and it is crucial that you attend this appointment.

## 2020-10-26 NOTE — DISCHARGE NOTE NURSING/CASE MANAGEMENT/SOCIAL WORK - PATIENT PORTAL LINK FT
You can access the FollowMyHealth Patient Portal offered by [facility] by registering at the following website: [email or web address]. By joining [facility]’s FollowMyHealth portal, you will also be able to view your health information using other applications (apps) compatible with our system.

## 2020-10-26 NOTE — CHART NOTE - NSCHARTNOTEFT_GEN_A_CORE
Admitting Diagnosis:   Patient is a 55y old  Female who presents with a chief complaint of Chest Pain  (26 Oct 2020 11:27)      PAST MEDICAL & SURGICAL HISTORY:  Anemia of chronic disease    HLD (hyperlipidemia)    Diastolic CHF    CKD (chronic kidney disease) stage 5, GFR less than 15 ml/min    Diabetes    Bronchitis    Kidney disease    HTN (hypertension)    No significant past surgical history        Current Nutrition Order:  Renal DASH Consistent Carbohydrate 1000mlFR      PO Intake: Good (%) [   ]  Fair (50-75%) [   ] Poor (<25%) [   ]  Please See Below    GI Issues:   WDL per flow sheets  BM+10/24     Pain:  bilateral calf tightness    Skin Integrity:  no edema/pressure ulcers noted at this time    Labs:   10-26    137  |  102  |  95<H>  ----------------------------<  106<H>  5.8<H>   |  21<L>  |  6.15<H>    Ca    9.6      26 Oct 2020 08:07  Mg     2.0     10-26      CAPILLARY BLOOD GLUCOSE      POCT Blood Glucose.: 94 mg/dL (26 Oct 2020 06:54)  POCT Blood Glucose.: 193 mg/dL (25 Oct 2020 21:34)  POCT Blood Glucose.: 116 mg/dL (25 Oct 2020 17:31)      Medications:  MEDICATIONS  (STANDING):  aspirin enteric coated 81 milliGRAM(s) Oral daily  atorvastatin 80 milliGRAM(s) Oral at bedtime  budesonide  80 MICROgram(s)/formoterol 4.5 MICROgram(s) Inhaler 2 Puff(s) Inhalation two times a day  calcium acetate 667 milliGRAM(s) Oral three times a day with meals  carvedilol 25 milliGRAM(s) Oral every 12 hours  clopidogrel Tablet 75 milliGRAM(s) Oral daily  dextrose 5%. 1000 milliLiter(s) (50 mL/Hr) IV Continuous <Continuous>  dextrose 50% Injectable 12.5 Gram(s) IV Push once  dextrose 50% Injectable 25 Gram(s) IV Push once  dextrose 50% Injectable 25 Gram(s) IV Push once  heparin   Injectable 5000 Unit(s) SubCutaneous every 8 hours  insulin glargine Injectable (LANTUS) 12 Unit(s) SubCutaneous at bedtime  insulin lispro (ADMELOG) corrective regimen sliding scale   SubCutaneous Before meals and at bedtime  insulin lispro Injectable (ADMELOG) 4 Unit(s) SubCutaneous three times a day before meals  melatonin 5 milliGRAM(s) Oral at bedtime  NIFEdipine XL 30 milliGRAM(s) Oral two times a day  senna 2 Tablet(s) Oral daily  sodium bicarbonate 650 milliGRAM(s) Oral three times a day  torsemide 40 milliGRAM(s) Oral two times a day    MEDICATIONS  (PRN):  acetaminophen   Tablet .. 650 milliGRAM(s) Oral every 6 hours PRN Moderate Pain (4 - 6)  ALPRAZolam 0.25 milliGRAM(s) Oral every 8 hours PRN Anxiety  aluminum hydroxide/magnesium hydroxide/simethicone Suspension 30 milliLiter(s) Oral every 6 hours PRN Dyspepsia  dextrose 40% Gel 15 Gram(s) Oral once PRN Blood Glucose LESS THAN 70 milliGRAM(s)/deciliter  dextrose 40% Gel 15 Gram(s) Oral once PRN Blood Glucose LESS THAN 70 milliGRAM(s)/deciliter  glucagon  Injectable 1 milliGRAM(s) IntraMuscular once PRN Glucose LESS THAN 70 milligrams/deciliter  levalbuterol Inhalation 0.63 milliGRAM(s) Inhalation every 6 hours PRN SOB/wheezing      5'5''  pounds +-10%   10/21 163 pounds  BMI 27.72 %EEU125    10/26 159.8 pounds  10/25 159.3 pounds  10/23 156.9 pounds  Varying EMR wts, assume wts varying d/t Fluids in CHF/Renal pt     Estimated energy needs:   IBW used to calculate energy needs due to pt's current body weight exceeding 120% of IBW  EER adjusted for age, based on CHF, Renal - fluids per team  1400-1680kcal/day 25-30kcal/kg  45-56gm/day 0.8-1.0gm/kg -- If pt remains off HD  68-78gm/day 1.2-1.4gm/kg -- If pt to begin HD     Subjective:   54 y/o F, former smoker, PMHx of Diastolic CHF (EF 55-60% by Echo on 10/21/20), HTN, HLD, IDDM, CKD Stage V- venous mapping completed 2 weeks ago, AOCD, multiple extended admission in September 2020 for fluid overload, ?PAD who now presents to [facility] ED on 10/21/20 c/o substernal chest pressure, radiating to left arm, 10/10 in severity, associated SOB, dizziness, diaphoresis, and dry cough. Pt is now admitted to [facility] for further management of acute on chronic diastolic CHF, HTN urgency, and r/o ACS, R/I NSTEMI; Echo revealed EF 55-60%. Nephrology consulted for MARIUM - MARIUM on CKD, possible acute hypoxic respiratory failure, possible acute CHF exacerbation, CRS. Pt had not been planned for HD during admission, however per IDR today, possible HD - may begin s/p D/C. K 5.8, BUN/Cr 95/6.15. Per SW possible D/C today.   Pt ordered for DASH Consistent Carbohydrate diet (no snack) + 1000ml FR; last 3 POCT 94 93 116, Night time Insulin ordered. Pt reports she is eating however exact %PO intake not stated, pt had hard time providing info as she reports being overwhelmed with pending HD. PTA pt with lack of PO intake PTA which has been ongoing x3 years. Pt does own cooking for most part PTA, no use of salt (+MRS DASH). Does not consistently consume High sodium items. Does like soup however makes herself (seems low to be low in sodium). Reports being told by Renal MD to consume "a lot of water." Also drinks 2 Glucerna/day (RD provided pt with coupons). Per renal notes - "previously not careful about fluid restriction." NKFA.  Please see below for nutritions recommendations Should pt not be D/C.     PRIOR PES: Inadequate Energy intake Etiology RT intake<EER Signs/Symptoms AEB NPO.   D/C, Pt on PO diet at this time.   Goal/Expected Outcome Diet to be advanced in 24-48hrs.    New PES: Decreased nutrient needs RT decreased demands (water, sodium) AEB decreased demand in CHF pt   Goal: Pt to not exceed daily intake of water/sodium      Recommendations:  1. Recommend DASH TLC diet.   >> If pt to start HD, Renal diet. Should pt remain off HD consider use of no conc K/Phos PRN.  2. FR per team.  3. Monitor %PO intake, Diet tolerance.  4. Labs: monitor BMP, CBC, glucose, lytes, trend renal indices, POCT.  5. Monitor Skin, Wts, GI, GOC.  6. RD to remain available for additional nutrition interventions as needed.     Education: Additional education provided - Reviewed Foods High/Low in sodium / potassium. Reviewed FR. Dicussed protein intake in setting of pt not yet / on HD. Handout from [facility] provided. Understanding stated, provide additional motivation as needed.     Risk Level: High [  X ] Moderate [   ] Low [   ]

## 2020-10-26 NOTE — PROGRESS NOTE ADULT - SUBJECTIVE AND OBJECTIVE BOX
Patient is a 55y Female seen and evaluated at bedside.   feels well  wants to go home   no SOB   BP acceptable   UOP adequate     Meds:    acetaminophen   Tablet .. 650 every 6 hours PRN  ALPRAZolam 0.25 every 8 hours PRN  aluminum hydroxide/magnesium hydroxide/simethicone Suspension 30 every 6 hours PRN  aspirin enteric coated 81 daily  atorvastatin 80 at bedtime  budesonide  80 MICROgram(s)/formoterol 4.5 MICROgram(s) Inhaler 2 two times a day  calcium acetate 667 three times a day with meals  carvedilol 25 every 12 hours  clopidogrel Tablet 75 daily  dextrose 40% Gel 15 once PRN  dextrose 40% Gel 15 once PRN  dextrose 5%. 1000 <Continuous>  dextrose 50% Injectable 12.5 once  dextrose 50% Injectable 25 once  dextrose 50% Injectable 25 once  glucagon  Injectable 1 once PRN  heparin   Injectable 5000 every 8 hours  insulin glargine Injectable (LANTUS) 12 at bedtime  insulin lispro (ADMELOG) corrective regimen sliding scale  Before meals and at bedtime  insulin lispro Injectable (ADMELOG) 4 three times a day before meals  levalbuterol Inhalation 0.63 every 6 hours PRN  melatonin 5 at bedtime  NIFEdipine XL 30 two times a day  senna 2 daily  sodium bicarbonate 650 three times a day      T(C): , Max: 37.1 (10-26-20 @ 06:21)  T(F): , Max: 98.8 (10-26-20 @ 06:21)  HR: 76 (10-26-20 @ 09:20)  BP: 122/76 (10-26-20 @ 09:20)  BP(mean): 89 (10-26-20 @ 05:38)  RR: 18 (10-26-20 @ 09:20)  SpO2: 99% (10-26-20 @ 09:20)  Wt(kg): --    10-25 @ 07:01  -  10-26 @ 07:00  --------------------------------------------------------  IN: 740 mL / OUT: 1550 mL / NET: -810 mL    10-26 @ 07:01  -  10-26 @ 10:48  --------------------------------------------------------  IN: 300 mL / OUT: 0 mL / NET: 300 mL        Review of Systems:  CONSTITUTIONAL: No fever or anorexia.  RESPIRATORY: no shortness of breath  CARDIOVASCULAR: no chest pain  GASTROINTESTINAL: No abdominal pain, nausea, vomiting, diarrhea  MUSCULOSKELETAL: No swelling       PHYSICAL EXAM:  GENERAL: Alert, awake, oriented x3 on RA breathing comfortably   CHEST/LUNG: Bilateral clear breath sounds  HEART: Regular rate and rhythm, no murmur, no gallops, no rub   ABDOMEN: Soft, nontender, non distended  EXTREMITIES: no pedal edema  NEURO: No asterixis         LABS:                        8.9    8.39  )-----------( 308      ( 26 Oct 2020 08:07 )             27.7     10-26    137  |  102  |  95<H>  ----------------------------<  106<H>  5.8<H>   |  21<L>  |  6.15<H>    Ca    9.6      26 Oct 2020 08:07  Mg     2.0     10-26                  RADIOLOGY & ADDITIONAL STUDIES:

## 2020-10-29 DIAGNOSIS — N17.9 ACUTE KIDNEY FAILURE, UNSPECIFIED: ICD-10-CM

## 2020-10-29 DIAGNOSIS — R07.9 CHEST PAIN, UNSPECIFIED: ICD-10-CM

## 2020-10-29 DIAGNOSIS — I16.0 HYPERTENSIVE URGENCY: ICD-10-CM

## 2020-10-29 DIAGNOSIS — N18.5 CHRONIC KIDNEY DISEASE, STAGE 5: ICD-10-CM

## 2020-10-29 DIAGNOSIS — E87.5 HYPERKALEMIA: ICD-10-CM

## 2020-10-29 DIAGNOSIS — Z79.899 OTHER LONG TERM (CURRENT) DRUG THERAPY: ICD-10-CM

## 2020-10-29 DIAGNOSIS — J43.9 EMPHYSEMA, UNSPECIFIED: ICD-10-CM

## 2020-10-29 DIAGNOSIS — E11.22 TYPE 2 DIABETES MELLITUS WITH DIABETIC CHRONIC KIDNEY DISEASE: ICD-10-CM

## 2020-10-29 DIAGNOSIS — I50.33 ACUTE ON CHRONIC DIASTOLIC (CONGESTIVE) HEART FAILURE: ICD-10-CM

## 2020-10-29 DIAGNOSIS — D72.829 ELEVATED WHITE BLOOD CELL COUNT, UNSPECIFIED: ICD-10-CM

## 2020-10-29 DIAGNOSIS — D63.1 ANEMIA IN CHRONIC KIDNEY DISEASE: ICD-10-CM

## 2020-10-29 DIAGNOSIS — I25.10 ATHEROSCLEROTIC HEART DISEASE OF NATIVE CORONARY ARTERY WITHOUT ANGINA PECTORIS: ICD-10-CM

## 2020-10-29 DIAGNOSIS — Z71.3 DIETARY COUNSELING AND SURVEILLANCE: ICD-10-CM

## 2020-10-29 DIAGNOSIS — I34.0 NONRHEUMATIC MITRAL (VALVE) INSUFFICIENCY: ICD-10-CM

## 2020-10-29 DIAGNOSIS — I13.2 HYPERTENSIVE HEART AND CHRONIC KIDNEY DISEASE WITH HEART FAILURE AND WITH STAGE 5 CHRONIC KIDNEY DISEASE, OR END STAGE RENAL DISEASE: ICD-10-CM

## 2020-10-29 DIAGNOSIS — Z79.4 LONG TERM (CURRENT) USE OF INSULIN: ICD-10-CM

## 2020-10-29 DIAGNOSIS — E11.51 TYPE 2 DIABETES MELLITUS WITH DIABETIC PERIPHERAL ANGIOPATHY WITHOUT GANGRENE: ICD-10-CM

## 2020-10-29 DIAGNOSIS — F17.200 NICOTINE DEPENDENCE, UNSPECIFIED, UNCOMPLICATED: ICD-10-CM

## 2020-10-29 DIAGNOSIS — J96.01 ACUTE RESPIRATORY FAILURE WITH HYPOXIA: ICD-10-CM

## 2020-10-29 DIAGNOSIS — Z88.0 ALLERGY STATUS TO PENICILLIN: ICD-10-CM

## 2020-10-29 DIAGNOSIS — I21.4 NON-ST ELEVATION (NSTEMI) MYOCARDIAL INFARCTION: ICD-10-CM

## 2020-10-29 DIAGNOSIS — E78.5 HYPERLIPIDEMIA, UNSPECIFIED: ICD-10-CM

## 2020-10-29 DIAGNOSIS — Z79.51 LONG TERM (CURRENT) USE OF INHALED STEROIDS: ICD-10-CM

## 2020-11-04 ENCOUNTER — APPOINTMENT (OUTPATIENT)
Dept: HEART AND VASCULAR | Facility: CLINIC | Age: 55
End: 2020-11-04

## 2021-07-09 NOTE — DIETITIAN INITIAL EVALUATION ADULT. - PROBLEM/PLAN-7
Detail Level: Detailed
Add 81432 Cpt? (Important Note: In 2017 The Use Of 97296 Is Being Tracked By Cms To Determine Future Global Period Reimbursement For Global Periods): yes
DISPLAY PLAN FREE TEXT

## 2023-04-12 NOTE — PROGRESS NOTE ADULT - PROBLEM SELECTOR PLAN 4
RESOLVED; WBC 18.46 on admission, no left shift. Currently afebrile, but endorses dysuria  - CXR right basilar opacity and b/l pleural effusions, now improved   - UA appears contaminated on admission, repeat showed present bacteria but negative nitrites and leukocytes   - patient reported intermittent urinary symptoms  - WBC 9.02 on 10/25, remains afebrile   - no antibiotics required at this time with patient

## 2024-03-15 NOTE — PROGRESS NOTE ADULT - PROBLEM SELECTOR PLAN 2
Negative Currently denying any chest pain  - given ASA 162mg PO x 1 by EMS and 162mg PO x 1 by ED attending, and loaded w/ Brilinta in ED  - started in heparin gtt, d/kervin as per Dr. Silva due to troponin leak likely due to demand ischemia, however restarted heparin gtt due to continued increase in troponin    - trop peaked at 0.41, no longer trending  - EKG Sinus Tach @ 128BPM, LVH w/ strain pattern upon admission, repeat EKG unchanged   - completed 48 hours of heparin gtt for medical management of NSTEMI  - Brilinta transitioned to Plavix, now continuing ASA 81 mg daily and Plavix 75 mg daily

## 2024-05-09 NOTE — PHYSICAL THERAPY INITIAL EVALUATION ADULT - THERAPY FREQUENCY, PT EVAL
2-3x/week
Partially impaired: cannot see medication labels or newsprint, but can see obstacles in path, and the surrounding layout; can count fingers at arm's length